# Patient Record
Sex: MALE | Race: WHITE | Employment: FULL TIME | URBAN - METROPOLITAN AREA
[De-identification: names, ages, dates, MRNs, and addresses within clinical notes are randomized per-mention and may not be internally consistent; named-entity substitution may affect disease eponyms.]

---

## 2021-05-26 ENCOUNTER — OFFICE VISIT (OUTPATIENT)
Dept: FAMILY MEDICINE CLINIC | Age: 61
End: 2021-05-26
Payer: COMMERCIAL

## 2021-05-26 VITALS
HEART RATE: 59 BPM | DIASTOLIC BLOOD PRESSURE: 62 MMHG | OXYGEN SATURATION: 96 % | WEIGHT: 245 LBS | HEIGHT: 75 IN | SYSTOLIC BLOOD PRESSURE: 98 MMHG | BODY MASS INDEX: 30.46 KG/M2

## 2021-05-26 DIAGNOSIS — Z13.1 DIABETES MELLITUS SCREENING: ICD-10-CM

## 2021-05-26 DIAGNOSIS — G89.29 CHRONIC PAIN OF RIGHT KNEE: ICD-10-CM

## 2021-05-26 DIAGNOSIS — M25.561 CHRONIC PAIN OF RIGHT KNEE: ICD-10-CM

## 2021-05-26 DIAGNOSIS — Z12.11 COLON CANCER SCREENING: ICD-10-CM

## 2021-05-26 DIAGNOSIS — Z00.00 ROUTINE GENERAL MEDICAL EXAMINATION AT A HEALTH CARE FACILITY: Primary | ICD-10-CM

## 2021-05-26 DIAGNOSIS — Z12.5 PROSTATE CANCER SCREENING: ICD-10-CM

## 2021-05-26 DIAGNOSIS — Z13.220 SCREENING CHOLESTEROL LEVEL: ICD-10-CM

## 2021-05-26 PROCEDURE — 99396 PREV VISIT EST AGE 40-64: CPT | Performed by: NURSE PRACTITIONER

## 2021-05-26 RX ORDER — IBUPROFEN 800 MG/1
TABLET ORAL
COMMUNITY
Start: 2021-02-26

## 2021-05-26 SDOH — ECONOMIC STABILITY: FOOD INSECURITY: WITHIN THE PAST 12 MONTHS, THE FOOD YOU BOUGHT JUST DIDN'T LAST AND YOU DIDN'T HAVE MONEY TO GET MORE.: NEVER TRUE

## 2021-05-26 SDOH — ECONOMIC STABILITY: FOOD INSECURITY: WITHIN THE PAST 12 MONTHS, YOU WORRIED THAT YOUR FOOD WOULD RUN OUT BEFORE YOU GOT MONEY TO BUY MORE.: NEVER TRUE

## 2021-05-26 ASSESSMENT — PATIENT HEALTH QUESTIONNAIRE - PHQ9
SUM OF ALL RESPONSES TO PHQ QUESTIONS 1-9: 0
2. FEELING DOWN, DEPRESSED OR HOPELESS: 0

## 2021-05-26 ASSESSMENT — ENCOUNTER SYMPTOMS
VOMITING: 0
DIARRHEA: 0
NAUSEA: 0
SHORTNESS OF BREATH: 0
COUGH: 0
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
## 2021-12-21 ENCOUNTER — VIRTUAL VISIT (OUTPATIENT)
Dept: FAMILY MEDICINE CLINIC | Age: 61
End: 2021-12-21
Payer: COMMERCIAL

## 2021-12-21 DIAGNOSIS — R09.89 CHEST CONGESTION: Primary | ICD-10-CM

## 2021-12-21 DIAGNOSIS — R09.81 SINUS CONGESTION: ICD-10-CM

## 2021-12-21 PROCEDURE — 99213 OFFICE O/P EST LOW 20 MIN: CPT | Performed by: FAMILY MEDICINE

## 2021-12-21 RX ORDER — ZINC GLUCONATE 50 MG
50 TABLET ORAL DAILY
COMMUNITY

## 2021-12-21 RX ORDER — DEXTROMETHORPHAN HYDROBROMIDE AND PROMETHAZINE HYDROCHLORIDE 15; 6.25 MG/5ML; MG/5ML
5 SYRUP ORAL 4 TIMES DAILY PRN
Qty: 180 ML | Refills: 1 | Status: SHIPPED | OUTPATIENT
Start: 2021-12-21

## 2021-12-21 RX ORDER — AZITHROMYCIN 250 MG/1
TABLET, FILM COATED ORAL
Qty: 1 PACKET | Refills: 0 | Status: ON HOLD | OUTPATIENT
Start: 2021-12-21 | End: 2022-04-13

## 2021-12-21 RX ORDER — MULTIVIT WITH MINERALS/LUTEIN
1000 TABLET ORAL 2 TIMES DAILY
COMMUNITY

## 2021-12-21 ASSESSMENT — PATIENT HEALTH QUESTIONNAIRE - PHQ9
1. LITTLE INTEREST OR PLEASURE IN DOING THINGS: 0
SUM OF ALL RESPONSES TO PHQ QUESTIONS 1-9: 0
SUM OF ALL RESPONSES TO PHQ9 QUESTIONS 1 & 2: 0
SUM OF ALL RESPONSES TO PHQ QUESTIONS 1-9: 0
SUM OF ALL RESPONSES TO PHQ QUESTIONS 1-9: 0
2. FEELING DOWN, DEPRESSED OR HOPELESS: 0

## 2021-12-21 ASSESSMENT — ENCOUNTER SYMPTOMS
WHEEZING: 1
SHORTNESS OF BREATH: 0
DIARRHEA: 0
NAUSEA: 0
TROUBLE SWALLOWING: 0
SORE THROAT: 1
BACK PAIN: 1
COUGH: 1

## 2021-12-21 NOTE — PROGRESS NOTES
2021    TELEHEALTH EVALUATION -- Audio/Visual (During QLJMF-29 public health emergency)    HPI:    Cynthia Alston (:  1960) has requested an audio/video evaluation for the following concern(s):    Cough and illness sx  Achy lower back. Some clear / whitish productive cough  Ex smoker- quit 10 years ago. Is working,   Review of Systems   Constitutional: Positive for fatigue. Hot / cold spells   HENT: Positive for congestion and sore throat. Negative for trouble swallowing. Respiratory: Positive for cough and wheezing. Negative for shortness of breath. Gastrointestinal: Negative for diarrhea and nausea. Had fundoplasty surgery-  Cannot vomit   Musculoskeletal: Positive for back pain. Neurological: Positive for headaches. Negative for seizures, speech difficulty and numbness. Minor HA's    There are no focal or lateralizing CNS / TIA like symptoms. Prior to Visit Medications    Medication Sig Taking? Authorizing Provider   zinc gluconate 50 MG tablet Take 50 mg by mouth daily Yes Historical Provider, MD   vitamin E 1000 units capsule Take 1,000 Units by mouth 2 times daily Yes Historical Provider, MD   ibuprofen (ADVIL;MOTRIN) 800 MG tablet TAKE 1 TABLET BY MOUTH EVERY 6 TO 8 HOURS AS NEEDED FOR MILD TO MODERATE PAIN Yes Historical Provider, MD   esomeprazole Magnesium (NEXIUM) 20 MG PACK Take by mouth daily Yes Historical Provider, MD   Magnesium 500 MG TABS Take 1,000 mg by mouth daily Yes Historical Provider, MD       Social History     Tobacco Use    Smoking status: Former Smoker     Quit date: 2016     Years since quittin.9    Smokeless tobacco: Never Used   Vaping Use    Vaping Use: Never used   Substance Use Topics    Alcohol use: Yes     Comment: 2-3 times a week    Drug use:  No            PHYSICAL EXAMINATION:  [ INSTRUCTIONS:  \"[x]\" Indicates a positive item  \"[]\" Indicates a negative item  -- DELETE ALL ITEMS NOT EXAMINED]  Vital Signs: (As obtained by patient/caregiver or practitioner observation)    Blood pressure-  Heart rate-    Respiratory rate-    Temperature-  Pulse oximetry-     Constitutional: [x] Appears well-developed and well-nourished [x] No apparent distress      [] Abnormal-   Mental status  [x] Alert and awake  [x] Oriented to person/place/time [x]Able to follow commands      Eyes:  EOM    [x]  Normal  [] Abnormal-  Sclera  []  Normal  [] Abnormal -         Discharge []  None visible  [] Abnormal -    HENT:   [x] Normocephalic, atraumatic. [] Abnormal   [] Mouth/Throat: Mucous membranes are moist.     External Ears [] Normal  [] Abnormal-     Neck: [] No visualized mass     Pulmonary/Chest: [x] Respiratory effort normal.  [x] No visualized signs of difficulty breathing or respiratory distress        [x] Abnormal- some cough  Sound loose / productive some   Musculoskeletal:   [] Normal gait with no signs of ataxia         [] Normal range of motion of neck        [] Abnormal-       Neurological:        [x] No Facial Asymmetry (Cranial nerve 7 motor function) (limited exam to video visit)          [x] No gaze palsy        [] Abnormal-         Skin:        [] No significant exanthematous lesions or discoloration noted on facial skin         [] Abnormal-            Psychiatric:       [x] Normal Affect [x] No Hallucinations        [] Abnormal-     Other pertinent observable physical exam findings-       ASSESSMENT    Sinus congestion  Cough        Symptoms discussed in the be viral.  Discussed symptomatic relief and antibiotics. Will prescribe but recommend he wait 1 or 2 days to take the antibiotic if possible as this may be viral and clear pretty much on its own. Patient expressed understanding. meds discussed. Try holding antibiotics. inst re meds    Phenergan dm  zithromax    covid discussed. Test if worse or sense of taste / smell changes    Follow-up in 2-3 days if significantly worse.     Nicol Fuentes Fe Dolan was evaluated through a synchronous (real-time) audio-video encounter. The patient (or guardian if applicable) is aware that this is a billable service. Verbal consent to proceed has been obtained within the past 12 months. The visit was conducted pursuant to the emergency declaration under the [address] [place] [address] and the [employer] Act. Patient identification was verified, and a caregiver was present when appropriate. The patient was located in a state where the provider was credentialed to provide care. Total time spent on this encounter: Not billed by time    --Ann Marie Vanegas DO on 12/21/2021 at 2:40 PM    An electronic signature was used to authenticate this note.

## 2021-12-23 ENCOUNTER — TELEPHONE (OUTPATIENT)
Dept: FAMILY MEDICINE CLINIC | Age: 61
End: 2021-12-23

## 2022-04-13 ENCOUNTER — APPOINTMENT (OUTPATIENT)
Dept: GENERAL RADIOLOGY | Age: 62
End: 2022-04-13
Payer: COMMERCIAL

## 2022-04-13 ENCOUNTER — HOSPITAL ENCOUNTER (OUTPATIENT)
Age: 62
Setting detail: OBSERVATION
Discharge: HOME | End: 2022-04-14
Attending: EMERGENCY MEDICINE | Admitting: INTERNAL MEDICINE
Payer: COMMERCIAL

## 2022-04-13 DIAGNOSIS — R07.9 CHEST PAIN, UNSPECIFIED TYPE: Primary | ICD-10-CM

## 2022-04-13 PROBLEM — R07.89 OTHER CHEST PAIN: Status: ACTIVE | Noted: 2022-04-13

## 2022-04-13 LAB
A/G RATIO: 1.7 (ref 1.1–2.2)
ALBUMIN SERPL-MCNC: 4.5 G/DL (ref 3.4–5)
ALP BLD-CCNC: 78 U/L (ref 40–129)
ALT SERPL-CCNC: 17 U/L (ref 10–40)
ANION GAP SERPL CALCULATED.3IONS-SCNC: 11 MMOL/L (ref 3–16)
AST SERPL-CCNC: 15 U/L (ref 15–37)
BASOPHILS ABSOLUTE: 0 K/UL (ref 0–0.2)
BASOPHILS RELATIVE PERCENT: 0.5 %
BILIRUB SERPL-MCNC: 0.4 MG/DL (ref 0–1)
BUN BLDV-MCNC: 16 MG/DL (ref 7–20)
CALCIUM SERPL-MCNC: 9.3 MG/DL (ref 8.3–10.6)
CHLORIDE BLD-SCNC: 105 MMOL/L (ref 99–110)
CO2: 25 MMOL/L (ref 21–32)
CREAT SERPL-MCNC: 0.8 MG/DL (ref 0.8–1.3)
EKG ATRIAL RATE: 61 BPM
EKG DIAGNOSIS: NORMAL
EKG P AXIS: 96 DEGREES
EKG P-R INTERVAL: 170 MS
EKG Q-T INTERVAL: 402 MS
EKG QRS DURATION: 92 MS
EKG QTC CALCULATION (BAZETT): 404 MS
EKG R AXIS: 16 DEGREES
EKG T AXIS: 41 DEGREES
EKG VENTRICULAR RATE: 61 BPM
EOSINOPHILS ABSOLUTE: 0 K/UL (ref 0–0.6)
EOSINOPHILS RELATIVE PERCENT: 0.7 %
GFR AFRICAN AMERICAN: >60
GFR NON-AFRICAN AMERICAN: >60
GLUCOSE BLD-MCNC: 119 MG/DL (ref 70–99)
HCT VFR BLD CALC: 44.1 % (ref 40.5–52.5)
HEMOGLOBIN: 15.3 G/DL (ref 13.5–17.5)
INFLUENZA A: NOT DETECTED
INFLUENZA B: NOT DETECTED
LYMPHOCYTES ABSOLUTE: 1.3 K/UL (ref 1–5.1)
LYMPHOCYTES RELATIVE PERCENT: 22 %
MCH RBC QN AUTO: 33 PG (ref 26–34)
MCHC RBC AUTO-ENTMCNC: 34.6 G/DL (ref 31–36)
MCV RBC AUTO: 95.5 FL (ref 80–100)
MONOCYTES ABSOLUTE: 0.6 K/UL (ref 0–1.3)
MONOCYTES RELATIVE PERCENT: 9.7 %
NEUTROPHILS ABSOLUTE: 4 K/UL (ref 1.7–7.7)
NEUTROPHILS RELATIVE PERCENT: 67.1 %
PDW BLD-RTO: 13.3 % (ref 12.4–15.4)
PLATELET # BLD: 158 K/UL (ref 135–450)
PMV BLD AUTO: 9.4 FL (ref 5–10.5)
POTASSIUM REFLEX MAGNESIUM: 4.1 MMOL/L (ref 3.5–5.1)
RBC # BLD: 4.62 M/UL (ref 4.2–5.9)
SARS-COV-2 RNA, RT PCR: NOT DETECTED
SODIUM BLD-SCNC: 141 MMOL/L (ref 136–145)
TOTAL PROTEIN: 7.1 G/DL (ref 6.4–8.2)
TROPONIN: <0.01 NG/ML
TROPONIN: <0.01 NG/ML
WBC # BLD: 6 K/UL (ref 4–11)

## 2022-04-13 PROCEDURE — 2060000000 HC ICU INTERMEDIATE R&B

## 2022-04-13 PROCEDURE — 84484 ASSAY OF TROPONIN QUANT: CPT

## 2022-04-13 PROCEDURE — 2580000003 HC RX 258: Performed by: INTERNAL MEDICINE

## 2022-04-13 PROCEDURE — 96374 THER/PROPH/DIAG INJ IV PUSH: CPT

## 2022-04-13 PROCEDURE — 93005 ELECTROCARDIOGRAM TRACING: CPT | Performed by: EMERGENCY MEDICINE

## 2022-04-13 PROCEDURE — 80053 COMPREHEN METABOLIC PANEL: CPT

## 2022-04-13 PROCEDURE — 99284 EMERGENCY DEPT VISIT MOD MDM: CPT

## 2022-04-13 PROCEDURE — 6370000000 HC RX 637 (ALT 250 FOR IP): Performed by: PHYSICIAN ASSISTANT

## 2022-04-13 PROCEDURE — 71046 X-RAY EXAM CHEST 2 VIEWS: CPT

## 2022-04-13 PROCEDURE — 6360000002 HC RX W HCPCS: Performed by: INTERNAL MEDICINE

## 2022-04-13 PROCEDURE — 85025 COMPLETE CBC W/AUTO DIFF WBC: CPT

## 2022-04-13 PROCEDURE — 87636 SARSCOV2 & INF A&B AMP PRB: CPT

## 2022-04-13 PROCEDURE — 96375 TX/PRO/DX INJ NEW DRUG ADDON: CPT

## 2022-04-13 PROCEDURE — 6370000000 HC RX 637 (ALT 250 FOR IP): Performed by: INTERNAL MEDICINE

## 2022-04-13 PROCEDURE — 93010 ELECTROCARDIOGRAM REPORT: CPT | Performed by: INTERNAL MEDICINE

## 2022-04-13 PROCEDURE — 6360000002 HC RX W HCPCS: Performed by: PHYSICIAN ASSISTANT

## 2022-04-13 PROCEDURE — 36415 COLL VENOUS BLD VENIPUNCTURE: CPT

## 2022-04-13 PROCEDURE — G0378 HOSPITAL OBSERVATION PER HR: HCPCS

## 2022-04-13 RX ORDER — ACETAMINOPHEN 650 MG/1
650 SUPPOSITORY RECTAL EVERY 6 HOURS PRN
Status: DISCONTINUED | OUTPATIENT
Start: 2022-04-13 | End: 2022-04-14 | Stop reason: HOSPADM

## 2022-04-13 RX ORDER — SODIUM CHLORIDE 0.9 % (FLUSH) 0.9 %
5-40 SYRINGE (ML) INJECTION EVERY 12 HOURS SCHEDULED
Status: DISCONTINUED | OUTPATIENT
Start: 2022-04-13 | End: 2022-04-14 | Stop reason: HOSPADM

## 2022-04-13 RX ORDER — SODIUM CHLORIDE 0.9 % (FLUSH) 0.9 %
5-40 SYRINGE (ML) INJECTION PRN
Status: DISCONTINUED | OUTPATIENT
Start: 2022-04-13 | End: 2022-04-14 | Stop reason: HOSPADM

## 2022-04-13 RX ORDER — ACETAMINOPHEN 325 MG/1
650 TABLET ORAL EVERY 6 HOURS PRN
Status: DISCONTINUED | OUTPATIENT
Start: 2022-04-13 | End: 2022-04-14 | Stop reason: HOSPADM

## 2022-04-13 RX ORDER — ONDANSETRON 2 MG/ML
4 INJECTION INTRAMUSCULAR; INTRAVENOUS EVERY 6 HOURS PRN
Status: DISCONTINUED | OUTPATIENT
Start: 2022-04-13 | End: 2022-04-14 | Stop reason: HOSPADM

## 2022-04-13 RX ORDER — ASPIRIN 81 MG/1
81 TABLET, CHEWABLE ORAL DAILY
Status: DISCONTINUED | OUTPATIENT
Start: 2022-04-14 | End: 2022-04-14 | Stop reason: HOSPADM

## 2022-04-13 RX ORDER — SODIUM CHLORIDE 9 MG/ML
INJECTION, SOLUTION INTRAVENOUS PRN
Status: DISCONTINUED | OUTPATIENT
Start: 2022-04-13 | End: 2022-04-14 | Stop reason: HOSPADM

## 2022-04-13 RX ORDER — MORPHINE SULFATE 4 MG/ML
4 INJECTION, SOLUTION INTRAMUSCULAR; INTRAVENOUS ONCE
Status: COMPLETED | OUTPATIENT
Start: 2022-04-13 | End: 2022-04-13

## 2022-04-13 RX ORDER — ONDANSETRON 2 MG/ML
4 INJECTION INTRAMUSCULAR; INTRAVENOUS EVERY 6 HOURS PRN
Status: DISCONTINUED | OUTPATIENT
Start: 2022-04-13 | End: 2022-04-13

## 2022-04-13 RX ORDER — ATORVASTATIN CALCIUM 40 MG/1
40 TABLET, FILM COATED ORAL NIGHTLY
Status: DISCONTINUED | OUTPATIENT
Start: 2022-04-13 | End: 2022-04-14 | Stop reason: HOSPADM

## 2022-04-13 RX ORDER — ASPIRIN 325 MG
325 TABLET ORAL ONCE
Status: COMPLETED | OUTPATIENT
Start: 2022-04-13 | End: 2022-04-13

## 2022-04-13 RX ORDER — ONDANSETRON 4 MG/1
4 TABLET, ORALLY DISINTEGRATING ORAL EVERY 8 HOURS PRN
Status: DISCONTINUED | OUTPATIENT
Start: 2022-04-13 | End: 2022-04-14 | Stop reason: HOSPADM

## 2022-04-13 RX ORDER — POLYETHYLENE GLYCOL 3350 17 G/17G
17 POWDER, FOR SOLUTION ORAL DAILY PRN
Status: DISCONTINUED | OUTPATIENT
Start: 2022-04-13 | End: 2022-04-14 | Stop reason: HOSPADM

## 2022-04-13 RX ADMIN — ACETAMINOPHEN 650 MG: 325 TABLET ORAL at 21:42

## 2022-04-13 RX ADMIN — SODIUM CHLORIDE, PRESERVATIVE FREE 10 ML: 5 INJECTION INTRAVENOUS at 21:42

## 2022-04-13 RX ADMIN — ENOXAPARIN SODIUM 30 MG: 100 INJECTION SUBCUTANEOUS at 21:42

## 2022-04-13 RX ADMIN — NITROGLYCERIN 1 INCH: 20 OINTMENT TOPICAL at 16:04

## 2022-04-13 RX ADMIN — ONDANSETRON HYDROCHLORIDE 4 MG: 2 INJECTION, SOLUTION INTRAMUSCULAR; INTRAVENOUS at 16:04

## 2022-04-13 RX ADMIN — ATORVASTATIN CALCIUM 40 MG: 40 TABLET, FILM COATED ORAL at 21:42

## 2022-04-13 RX ADMIN — ASPIRIN 325 MG: 325 TABLET ORAL at 16:04

## 2022-04-13 RX ADMIN — MORPHINE SULFATE 4 MG: 4 INJECTION, SOLUTION INTRAMUSCULAR; INTRAVENOUS at 16:04

## 2022-04-13 ASSESSMENT — PAIN DESCRIPTION - ORIENTATION
ORIENTATION: MID
ORIENTATION: MID

## 2022-04-13 ASSESSMENT — PAIN SCALES - GENERAL
PAINLEVEL_OUTOF10: 6
PAINLEVEL_OUTOF10: 6
PAINLEVEL_OUTOF10: 4
PAINLEVEL_OUTOF10: 6
PAINLEVEL_OUTOF10: 4

## 2022-04-13 ASSESSMENT — PAIN DESCRIPTION - PAIN TYPE
TYPE: ACUTE PAIN
TYPE: ACUTE PAIN

## 2022-04-13 ASSESSMENT — PAIN DESCRIPTION - LOCATION
LOCATION: CHEST
LOCATION: CHEST

## 2022-04-13 ASSESSMENT — ENCOUNTER SYMPTOMS
GASTROINTESTINAL NEGATIVE: 1
RESPIRATORY NEGATIVE: 1

## 2022-04-13 ASSESSMENT — PAIN DESCRIPTION - FREQUENCY
FREQUENCY: CONTINUOUS
FREQUENCY: CONTINUOUS

## 2022-04-13 ASSESSMENT — PAIN DESCRIPTION - DESCRIPTORS
DESCRIPTORS: ACHING;TIGHTNESS
DESCRIPTORS: TIGHTNESS;ACHING;SHARP

## 2022-04-13 ASSESSMENT — HEART SCORE: ECG: 0

## 2022-04-13 NOTE — ED PROVIDER NOTES
I independently evaluated and obtained a history and physical on Alvaro Leslie. I personally saw the patient and performed a substantive portion of the visit including all aspects of the medical decision making. All diagnostic, treatment, and disposition assistants were made to myself in conjunction the advanced practice provider. For further details of this patient's emergency department encounter, please see the advanced practice provider's documentation. History: Patient is a 70-year-old male who presents for 24 hours of substernal chest pain which is rating to his shoulder and neck. Patient reports pain is sharp he can take, constant, and worsening. He denies any known aggravating or alleviating factors. No focal neurologic deficits. Lungs clear to auscultation. Regular rate and rhythm  Physician Exam: Pleasant adult  male in no acute distress. MDM:    I personally saw the patient and performed a substantive portion of the visit including all aspects of the medical decision making. The Ekg interpreted by me shows  normal sinus rhythm with a rate of 61  Axis is   Normal  QTc is  404ms  Intervals and Durations are unremarkable. ST Segments: normal  No previous EKGs available for comparison    Initial EKG and troponin undetectable. Patient with heart score of 4. Patient is given aspirin nitroglycerin and admitted for further evaluation and care. Patient expresses understanding and agreement with this plan. FINAL IMPRESSION      1.  Chest pain, unspecified type               Petey Myers MD  04/13/22 1640

## 2022-04-13 NOTE — ED PROVIDER NOTES
[address] [facility]  EMERGENCY DEPARTMENT ENCOUNTER        Pt Name: Leonila Moore  MRN: [id number]  [place] 1960  Date of evaluation: 4/13/2022  Provider: Shital Shah PA-C  PCP: SWAYER Garcia CNP  Note Started: 3:38 PM EDT        I have seen and evaluated this patient with my supervising physician Prabhu Olivo MD.    [address]       Chief Complaint   Patient presents with    Chest Pain     Midsternal chest pain for 24 hours, states pain goes in to shoulder and neck       HISTORY OF PRESENT ILLNESS   (Location, Timing/Onset, Context/Setting, Quality, Duration, Modifying Factors, Severity, Associated Signs and Symptoms)  Note limiting factors. Chief Complaint: chest pain     Leonila Moore is a 58 y.o. male with a past medical history of former smoker, GERD brought in today by private vehicle with complaints of midsternal chest pain over the past 1 day. That he developed midsternal chest pain yesterday while at work while at rest.  Describes it as a dull deep ache. States that the pain radiates into his neck and left shoulder. Denies shortness of breath. Denies numbness or tingling. Onset over the past 24 hours. Duration symptoms have been persistent since onset. Context includes midsternal chest pain. No aggravating symptoms. No alleviating symptoms. Denies any injury or trauma. States has never had pain like this before. Took ibuprofen this morning with no relief. Rates current pain a 7 out of 10. Otherwise denies any other complaints. Nothing seems to make symptoms better or worse. Nursing Notes were all reviewed and agreed with or any disagreements were addressed in the HPI. REVIEW OF SYSTEMS    (2-9 systems for level 4, 10 or more for level 5)     Review of Systems   Constitutional: Negative. HENT: Negative. Respiratory: Negative. Cardiovascular: Positive for chest pain. Gastrointestinal: Negative. Genitourinary: Negative. Neurological: Negative. Positives and Pertinent negatives as per HPI. Except as noted above in the ROS, all other systems were reviewed and negative. PAST MEDICAL HISTORY     Past Medical History:   Diagnosis Date    Diverticulitis     GERD (gastroesophageal reflux disease)          SURGICAL HISTORY     Past Surgical History:   Procedure Laterality Date    CHOLECYSTECTOMY      GASTRIC FUNDOPLICATION      KNEE SURGERY Right     multiple         CURRENTMEDICATIONS       Previous Medications    AZITHROMYCIN (ZITHROMAX Z-MARTHA) 250 MG TABLET    As directed on pack    ESOMEPRAZOLE MAGNESIUM (NEXIUM) 20 MG PACK    Take by mouth daily    IBUPROFEN (ADVIL;MOTRIN) 800 MG TABLET    TAKE 1 TABLET BY MOUTH EVERY 6 TO 8 HOURS AS NEEDED FOR MILD TO MODERATE PAIN    MAGNESIUM 500 MG TABS    Take 1,000 mg by mouth daily    PROMETHAZINE-DEXTROMETHORPHAN (PROMETHAZINE-DM) 6.25-15 MG/5ML SYRUP    Take 5 mLs by mouth 4 times daily as needed for Cough (coughing. .caution drowsiness)    VITAMIN E 1000 UNITS CAPSULE    Take 1,000 Units by mouth 2 times daily    ZINC GLUCONATE 50 MG TABLET    Take 50 mg by mouth daily         ALLERGIES     Pcn [penicillins]    FAMILYHISTORY       Family History   Problem Relation Age of Onset    Colon Cancer Father     Parkinsonism Father           SOCIAL HISTORY       Social History     Tobacco Use    Smoking status: Former Smoker     Quit date:      Years since quittin.2    Smokeless tobacco: Never Used   Vaping Use    Vaping Use: Never used   Substance Use Topics    Alcohol use: Yes     Comment: 2-3 times a week    Drug use: No       SCREENINGS    Olga Coma Scale  Eye Opening: Spontaneous  Best Verbal Response: Oriented  Best Motor Response: Obeys commands  Olga Coma Scale Score: 15 Heart Score for chest pain patients  History: Highly Suspicious  ECG: Normal  Patient Age: > 39 and < 65 years  *Risk factors for Atherosclerotic disease: Obesity,Cigarette smoking,Positive family History  Risk Factors: 1 or 2 risk factors  Troponin: < 1X normal limit  Heart Score Total: 4      PHYSICAL EXAM    (up to 7 for level 4, 8 or more for level 5)     ED Triage Vitals [04/13/22 1515]   BP Temp Temp Source Pulse Resp SpO2 Height Weight   (!) 161/90 97.5 °F (36.4 °C) Temporal 60 16 98 % -- --       Physical Exam  Vitals and nursing note reviewed. Constitutional:       General: He is awake. He is not in acute distress. Appearance: Normal appearance. He is well-developed and overweight. He is not ill-appearing, toxic-appearing or diaphoretic. HENT:      Head: Normocephalic and atraumatic. Nose: Nose normal.   Eyes:      General:         Right eye: No discharge. Left eye: No discharge. Cardiovascular:      Rate and Rhythm: Normal rate and regular rhythm. Pulses:           Radial pulses are 2+ on the right side and 2+ on the left side. Heart sounds: Normal heart sounds. No murmur heard. No gallop. Pulmonary:      Effort: Pulmonary effort is normal. No respiratory distress. Breath sounds: Normal breath sounds. No decreased breath sounds, wheezing, rhonchi or rales. Chest:      Chest wall: No tenderness. Musculoskeletal:         General: No deformity. Normal range of motion. Cervical back: Normal range of motion and neck supple. Right lower leg: No edema. Left lower leg: No edema. Skin:     General: Skin is warm and dry. Neurological:      Mental Status: He is alert and oriented to person, place, and time. Psychiatric:         Behavior: Behavior normal. Behavior is cooperative.          DIAGNOSTIC RESULTS   LABS:    Labs Reviewed   COMPREHENSIVE METABOLIC PANEL W/ REFLEX TO MG FOR LOW K - Abnormal; Notable for the following components:       Result Value    Glucose 119 (*)     All other components within normal limits   COVID-19 & INFLUENZA COMBO   TROPONIN   CBC WITH AUTO DIFFERENTIAL       When ordered only abnormal lab results are displayed. All other labs were within normal range or not returned as of this dictation. EKG: When ordered, EKG's are interpreted by the Emergency Department Physician in the absence of a cardiologist.  Please see their note for interpretation of EKG. RADIOLOGY:   Non-plain film images such as CT, Ultrasound and MRI are read by the radiologist. Plain radiographic images are visualized and preliminarily interpreted by the ED Provider with the below findings:        Interpretation per the Radiologist below, if available at the time of this note:    XR CHEST (2 VW)   Final Result   Clear lungs. No results found. PROCEDURES   Unless otherwise noted below, none     Procedures    CRITICAL CARE TIME       CONSULTS:  IP CONSULT TO HOSPITALIST      EMERGENCY DEPARTMENT COURSE and DIFFERENTIAL DIAGNOSIS/MDM:   Vitals:    Vitals:    04/13/22 1515 04/13/22 1600 04/13/22 1615 04/13/22 1645   BP: (!) 161/90 124/80 (!) 141/88 136/83   Pulse: 60 60 57 55   Resp: 16 15 9 13   Temp: 97.5 °F (36.4 °C)      TempSrc: Temporal      SpO2: 98% 95% 92% 93%       Patient was given the following medications:  Medications   ondansetron (ZOFRAN) injection 4 mg (4 mg IntraVENous Given 4/13/22 1604)   aspirin tablet 325 mg (325 mg Oral Given 4/13/22 1604)   morphine sulfate (PF) injection 4 mg (4 mg IntraVENous Given 4/13/22 1604)   nitroglycerin (NITRO-BID) 2 % ointment 1 inch (1 inch Topical Given 4/13/22 1604)           Patient brought in today by private vehicle with complaints of midsternal chest pain x24 hours. On exam he is alert oriented afebrile breathing on room air satting at 98%. Nontoxic in appearance. No acute respiratory distress. Old labs and records reviewed. Patient seen by myself as well as my attending. EKG reviewed by my attending see note. CBC shows no white count. Hemoglobin of 15.3. Chest x-ray shows clear lungs.   Patient was given full dose aspirin and morphine and Nitropaste here. Troponin less than 0.01. No acute electrolyte abnormalities. Kidney function unremarkable. Patient has a heart score of 4. He was given medication here and upon reevaluation states his pain has improved. We will plan to admit at this time. I spoke to the hospitalist Dr. Windy Orantes did accept this admission. Patient stable at time of admission. FINAL IMPRESSION      1. Chest pain, unspecified type          DISPOSITION/PLAN   DISPOSITION Decision To Admit 04/13/2022 04:40:15 PM      PATIENT REFERRED TO:  No follow-up provider specified.     DISCHARGE MEDICATIONS:  New Prescriptions    No medications on file       DISCONTINUED MEDICATIONS:  Discontinued Medications    No medications on file              (Please note that portions of this note were completed with a voice recognition program.  Efforts were made to edit the dictations but occasionally words are mis-transcribed.)    Digna Vaughn PA-C (electronically signed)            Digna Vaughn PA-C  04/13/22 1679

## 2022-04-13 NOTE — ED NOTES
[phone] Perfectserve sent to Dr. Jessenia Rasheed  04/13/22 [phone] Consult completed with callback from Dr. Steven Cunningham spoke with Anselmo SEYMOUR.       Christopher Rae  04/13/22 0882

## 2022-04-14 ENCOUNTER — APPOINTMENT (OUTPATIENT)
Dept: NUCLEAR MEDICINE | Age: 62
End: 2022-04-14
Payer: COMMERCIAL

## 2022-04-14 VITALS
TEMPERATURE: 98.2 F | HEIGHT: 75 IN | OXYGEN SATURATION: 94 % | WEIGHT: 242.8 LBS | RESPIRATION RATE: 18 BRPM | SYSTOLIC BLOOD PRESSURE: 127 MMHG | DIASTOLIC BLOOD PRESSURE: 81 MMHG | BODY MASS INDEX: 30.19 KG/M2 | HEART RATE: 68 BPM

## 2022-04-14 LAB
CHOLESTEROL, TOTAL: 183 MG/DL (ref 0–199)
EKG ATRIAL RATE: 58 BPM
EKG DIAGNOSIS: NORMAL
EKG P AXIS: 57 DEGREES
EKG P-R INTERVAL: 166 MS
EKG Q-T INTERVAL: 418 MS
EKG QRS DURATION: 96 MS
EKG QTC CALCULATION (BAZETT): 410 MS
EKG R AXIS: 45 DEGREES
EKG T AXIS: 64 DEGREES
EKG VENTRICULAR RATE: 58 BPM
HCT VFR BLD CALC: 42.2 % (ref 40.5–52.5)
HDLC SERPL-MCNC: 43 MG/DL (ref 40–60)
HEMOGLOBIN: 14.5 G/DL (ref 13.5–17.5)
LDL CHOLESTEROL CALCULATED: 125 MG/DL
LV EF: 65 %
LVEF MODALITY: NORMAL
MCH RBC QN AUTO: 32.9 PG (ref 26–34)
MCHC RBC AUTO-ENTMCNC: 34.4 G/DL (ref 31–36)
MCV RBC AUTO: 95.4 FL (ref 80–100)
PDW BLD-RTO: 13.3 % (ref 12.4–15.4)
PLATELET # BLD: 147 K/UL (ref 135–450)
PMV BLD AUTO: 9.6 FL (ref 5–10.5)
RBC # BLD: 4.42 M/UL (ref 4.2–5.9)
TRIGL SERPL-MCNC: 77 MG/DL (ref 0–150)
TROPONIN: <0.01 NG/ML
VLDLC SERPL CALC-MCNC: 15 MG/DL
WBC # BLD: 5.6 K/UL (ref 4–11)

## 2022-04-14 PROCEDURE — 36415 COLL VENOUS BLD VENIPUNCTURE: CPT

## 2022-04-14 PROCEDURE — 93005 ELECTROCARDIOGRAM TRACING: CPT | Performed by: INTERNAL MEDICINE

## 2022-04-14 PROCEDURE — A9502 TC99M TETROFOSMIN: HCPCS | Performed by: INTERNAL MEDICINE

## 2022-04-14 PROCEDURE — 2580000003 HC RX 258: Performed by: INTERNAL MEDICINE

## 2022-04-14 PROCEDURE — G0378 HOSPITAL OBSERVATION PER HR: HCPCS

## 2022-04-14 PROCEDURE — 6370000000 HC RX 637 (ALT 250 FOR IP): Performed by: INTERNAL MEDICINE

## 2022-04-14 PROCEDURE — 93017 CV STRESS TEST TRACING ONLY: CPT

## 2022-04-14 PROCEDURE — 85027 COMPLETE CBC AUTOMATED: CPT

## 2022-04-14 PROCEDURE — 84484 ASSAY OF TROPONIN QUANT: CPT

## 2022-04-14 PROCEDURE — 99220 PR INITIAL OBSERVATION CARE/DAY 70 MINUTES: CPT | Performed by: INTERNAL MEDICINE

## 2022-04-14 PROCEDURE — 93010 ELECTROCARDIOGRAM REPORT: CPT | Performed by: INTERNAL MEDICINE

## 2022-04-14 PROCEDURE — 78452 HT MUSCLE IMAGE SPECT MULT: CPT

## 2022-04-14 PROCEDURE — 3430000000 HC RX DIAGNOSTIC RADIOPHARMACEUTICAL: Performed by: INTERNAL MEDICINE

## 2022-04-14 PROCEDURE — 80061 LIPID PANEL: CPT

## 2022-04-14 RX ADMIN — ACETAMINOPHEN 650 MG: 325 TABLET ORAL at 06:15

## 2022-04-14 RX ADMIN — TETROFOSMIN 10.1 MILLICURIE: 1.38 INJECTION, POWDER, LYOPHILIZED, FOR SOLUTION INTRAVENOUS at 12:05

## 2022-04-14 RX ADMIN — ASPIRIN 81 MG 81 MG: 81 TABLET ORAL at 17:08

## 2022-04-14 RX ADMIN — SODIUM CHLORIDE, PRESERVATIVE FREE 10 ML: 5 INJECTION INTRAVENOUS at 17:08

## 2022-04-14 RX ADMIN — TETROFOSMIN 31.4 MILLICURIE: 1.38 INJECTION, POWDER, LYOPHILIZED, FOR SOLUTION INTRAVENOUS at 14:30

## 2022-04-14 ASSESSMENT — PAIN SCALES - GENERAL
PAINLEVEL_OUTOF10: 7
PAINLEVEL_OUTOF10: 5

## 2022-04-14 ASSESSMENT — PAIN DESCRIPTION - LOCATION: LOCATION: CHEST

## 2022-04-14 ASSESSMENT — PAIN DESCRIPTION - ORIENTATION: ORIENTATION: MID

## 2022-04-14 ASSESSMENT — PAIN DESCRIPTION - PAIN TYPE: TYPE: ACUTE PAIN

## 2022-04-14 ASSESSMENT — PAIN DESCRIPTION - FREQUENCY: FREQUENCY: CONTINUOUS

## 2022-04-14 ASSESSMENT — PAIN DESCRIPTION - DESCRIPTORS: DESCRIPTORS: ACHING;TIGHTNESS

## 2022-04-14 NOTE — PROGRESS NOTES
Patient admitted to room 305 from . Patient oriented to room, call light, bed rails, phone, lights and bathroom. Patient instructed about the schedule of the day including: vital sign frequency, lab draws, possible tests, frequency of MD and staff rounds, daily weights, I &O's and prescribed diet. Telemetry box in place, patient aware of placement and reason. Bed locked, in lowest position, side rails up 2/4, call light within reach. Recliner Assessment  Patient is able to demonstrate the ability to move from a reclining position to an upright position within the recliner. 4 Eyes Skin Assessment     The patient is being assess for   Admission    I agree that 2 RN's have performed a thorough Head to Toe Skin Assessment on the patient. ALL assessment sites listed below have been assessed. Areas assessed for pressure by both nurses:   [x]   Head, Face, and Ears   [x]   Shoulders, Back, and Chest, Abdomen  [x]   Arms, Elbows, and Hands   [x]   Coccyx, Sacrum, and Ischium  [x]   Legs, Feet, and Heels     Skin is clean, dry, and intact. Scattered bruising and abrasions. Skin Assessed Under all Medical Devices by both nurses:  N/A              All Mepilex Borders were peeled back and area peeked at by both nurses:  No: N/A  Please list where Mepilex Borders are located:             **SHARE this note so that the co-signing nurse is able to place an eSignature**    Co-signer eSignature: {Esignature:[id number]}    Does the Patient have Skin Breakdown related to pressure?   No            Javier Prevention initiated:  NA   Wound Care Orders initiated:  NA      [facility] nurse consulted for Pressure Injury (Stage 3,4, Unstageable, DTI, NWPT, Complex wounds)and New or Established Ostomies:  NA      Primary Nurse eSignature: Electronically signed by Bonifacio Esparza RN on 4/14/22 at 3:30 AM EDT

## 2022-04-14 NOTE — FLOWSHEET NOTE
04/13/22 2113   Vital Signs   Temp 97.3 °F (36.3 °C)   Temp Source Oral   Pulse 58   Heart Rate Source Monitor   Resp 18   BP (!) 150/83   BP Location Right upper arm   Patient Position Sitting   Level of Consciousness Alert (0)   MEWS Score 1   Patient Currently in Pain Denies   Pain Assessment   Pain Level 6   Pain Type Acute pain   Pain Location Chest   Pain Orientation Mid  (radiates to neck and left shoulder)   Pain Descriptors Aching;Tightness   Pain Frequency Continuous   Oxygen Therapy   SpO2 96 %   O2 Device None (Room air)   Height and Weight   Height 6' 3\" (1.905 m)   Weight 242 lb 11.2 oz (110.1 kg)   Weight Method Standing scale   BSA (Calculated - sq m) 2.41 sq meters   BMI (Calculated) 30.4     Admission assessment complete, see flowsheets. Medications given, see MAR. VSS. No s/s of distress. Pt complaining of 6/10 chest pain and headache, PRN given. Chest pain staying consistent. Pt denies SOB. No needs at this time. Pt educated on NPO status at 0000. Drink provided. No needs at this time. Bed locked in lowest position. Call light and bedside table within reach. Will continue to monitor.

## 2022-04-14 NOTE — FLOWSHEET NOTE
04/14/22 0215   Vital Signs   Temp 97 °F (36.1 °C)   Temp Source Oral   Pulse 67   Heart Rate Source Monitor   Resp 16   BP (!) 100/55   BP Location Right upper arm   Patient Position Lying left side   Level of Consciousness Alert (0)   MEWS Score 2   Patient Currently in Pain Yes   Pain Assessment   Pain Level 5   Pain Type Acute pain   Pain Location Chest   Pain Orientation Mid   Pain Descriptors Aching;Tightness   Pain Frequency Continuous   Oxygen Therapy   SpO2 91 %   O2 Device None (Room air)   Height and Weight   Height 6' 3\" (1.905 m)   Weight 242 lb 12.8 oz (110.1 kg)   Weight Method Standing scale   BSA (Calculated - sq m) 2.41 sq meters   BMI (Calculated) 30.4     Pt resting in bed asleep. VSS. No s/s of distress. Pt denies complaints of SOB. Pt complaining of 5/10 chest pain, no worse than before. Pt states that he has pain in his neck and left shoulder when he moves around. Pt made NPO at midnight. No needs at this time. Bed locked in lowest position. Call light and bedside table within reach. Will continue to monitor.

## 2022-04-14 NOTE — FLOWSHEET NOTE
04/14/22 1706   Vital Signs   Temp 98.2 °F (36.8 °C)   Temp Source Oral   Pulse 68   Heart Rate Source Monitor   Resp 18   /81   BP Location Right upper arm   Patient Position Semi fowlers   Level of Consciousness Alert (0)   MEWS Score 1   Patient Currently in Pain Denies   Oxygen Therapy   SpO2 94 %   Pulse Oximeter Device Mode Intermittent   Pulse Oximeter Device Location Finger   O2 Device None (Room air)   Patient returned from stress. Diet ordered. meds given. Awaiting results.

## 2022-04-14 NOTE — PROGRESS NOTES
Patient educated on discharge instructions as well as new medications use, dosage, administration and possible side effects. Patient verified knowledge. IV removed without difficulty and dry dressing in place. Telemetry monitor removed and returned to [facility]. Pt waiting to leave facility in stable condition to Home with all of their personal belongings.

## 2022-04-14 NOTE — FLOWSHEET NOTE
04/14/22 0856   Vital Signs   Temp 97.9 °F (36.6 °C)   Temp Source Oral   Pulse 60   Heart Rate Source Monitor   Resp 16   /80   BP Location Right upper arm   Patient Position Semi fowlers   Level of Consciousness Alert (0)   MEWS Score 1   Patient Currently in Pain Denies   Oxygen Therapy   SpO2 93 %   Pulse Oximeter Device Mode Intermittent   Pulse Oximeter Device Location Finger   O2 Device None (Room air)   Patient is resting showing no s/s of distress. Patient is alert and oriented. Meds were HELD for cardiac testing, see MAR. Patient is denying any needs. Bed is in lowest position and call light is within reach. Will continue to monitor. Shift assessment complete, see flowsheets. Patient states the chest pain is still persistent but not as intense.

## 2022-04-14 NOTE — H&P
Hospital Medicine History & Physical      PCP: SAWYER Chappell - CNP    Date of Admission: 4/13/2022    Date of Service: Pt seen/examined on 4/14/2022      Chief Complaint:    Chief Complaint   Patient presents with    Chest Pain     Midsternal chest pain for 24 hours, states pain goes in to shoulder and neck         History Of Present Illness: The patient is a 58 y.o. male with GERD and diverticulosis who presented to [facility] ED with complaint of chest pain that has been present since 4/12 evening. 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 Located at the left side of his chest and radiates down his left arm and into his back. Describes it as stabbing. Associated with diaphoresis, SOB and nausea. Aggravated by any movement. Alleviated by nothing including nitro or morphine. Has been persistent since onset but states it has subsided somewhat this morning. He endorses a history of GERD and has had a nissen fundoplication in the past. He has also had GB issues that required a cholecystectomy. He has had diverticulitis in the past. He initially felt this could be related to his GI issues but states this is different than any previous GI related issues. He denies any URI symptoms, cough, pain with inspiration, recent new exercises or heavy lifting, abd pain, vomiting, diarrhea, numbness, tingling or swelling. Cardiac risk factors: HTN no, HLD no, DM no, Tobacco use yes x 40 years, achieved cessation 7 years ago, first degree family or personal history of CAD no    The patient denies any known history of connective tissue disease or aneurysms. The patient denies any recent surgery, hospitalization, immobilization, long car/plane trip, active malignancy, long bone fracture, or history of DVT/PE. Since arrival his vitals have been stable. Labs and CXR unremarkable. Trop trend has been negative x3. Presently he complains of a HA related to nitro given.  He admitted for cardiac stress this morning. Past Medical History:        Diagnosis Date    Diverticulitis     GERD (gastroesophageal reflux disease)        Past Surgical History:        Procedure Laterality Date    CHOLECYSTECTOMY      GASTRIC FUNDOPLICATION      KNEE SURGERY Right     multiple       Medications Prior to Admission:    Prior to Admission medications    Medication Sig Start Date End Date Taking? Authorizing Provider   zinc gluconate 50 MG tablet Take 50 mg by mouth daily    Historical Provider, MD   vitamin E 1000 units capsule Take 1,000 Units by mouth 2 times daily    Historical Provider, MD   promethazine-dextromethorphan (PROMETHAZINE-DM) 6.25-15 MG/5ML syrup Take 5 mLs by mouth 4 times daily as needed for Cough (coughing. .caution drowsiness) 12/21/21   Venice Osborne,    ibuprofen (ADVIL;MOTRIN) 800 MG tablet TAKE 1 TABLET BY MOUTH EVERY 6 TO 8 HOURS AS NEEDED FOR MILD TO MODERATE PAIN 2/26/21   Historical Provider, MD   esomeprazole Magnesium (NEXIUM) 20 MG PACK Take by mouth daily    Historical Provider, MD   Magnesium 500 MG TABS Take 1,000 mg by mouth daily    Historical Provider, MD       Allergies:  Pcn [penicillins]    Social History:  The patient currently lives at home with wife    TOBACCO:   reports that he quit smoking about 6 years ago. He has never used smokeless tobacco.  ETOH:   reports current alcohol use.       Family History:   Positive as follows:        Problem Relation Age of Onset    Colon Cancer Father     Parkinsonism Father        REVIEW OF SYSTEMS:       Constitutional: Negative for fever + diaphoresis  HENT: Negative for sore throat   Eyes: Negative for redness   Respiratory: + for dyspnea, negative for cough   Cardiovascular: + for chest pain   Gastrointestinal: Negative for vomiting, diarrhea, +nausea  Genitourinary: Negative for hematuria   Musculoskeletal: Negative for arthralgias   Skin: Negative for rash   Neurological: Negative for syncope   Hematological: Negative for adenopathy   Psychiatric/Behavorial: Negative for anxiety    PHYSICAL EXAM:    BP (!) 100/55   Pulse 67   Temp 97 °F (36.1 °C) (Oral)   Resp 16   Ht 6' 3\" (1.905 m)   Wt 242 lb 12.8 oz (110.1 kg)   SpO2 91%   BMI 30.35 kg/m²     Gen: No distress. Alert. Covering face with arm 2/2 nitro induced headache  Eyes: PERRL. No sclera icterus. No conjunctival injection. ENT: No discharge. Pharynx clear. Neck: No JVD. Trachea midline. Resp: No accessory muscle use. No crackles. No wheezes. No rhonchi. CV: Regular rate. Regular rhythm. No murmur. No rub. No edema. Capillary Refill: Brisk,< 3 seconds   Peripheral Pulses: +2 palpable, equal bilaterally   GI: Non-tender. Non-distended. No masses. No organomegaly. Normal bowel sounds. No hernia. Skin: Warm and dry. No nodule on exposed extremities. No rash on exposed extremities. M/S: No cyanosis. No joint deformity. No clubbing. Neuro: Awake. Grossly nonfocal    Psych: Oriented x 3. No anxiety or agitation. CBC:   Recent Labs     04/13/22  1529 04/14/22  0420   WBC 6.0 5.6   HGB 15.3 14.5   HCT 44.1 42.2   MCV 95.5 95.4    147     BMP:   Recent Labs     04/13/22  1529      K 4.1      CO2 25   BUN 16   CREATININE 0.8     LIVER PROFILE:   Recent Labs     04/13/22  1529   AST 15   ALT 17   BILITOT 0.4   ALKPHOS 78       CARDIAC ENZYMES  Recent Labs     04/13/22  1529 04/13/22  2134 04/14/22  0020   TROPONINI <0.01 <0.01 <0.01       CULTURES  Results for Christina Cleary (MRN [id number]) as of 4/14/2022 08:18   Ref.  Range 4/13/2022 17:00   INFLUENZA A Latest Ref Range: NOT DETECTED  NOT DETECTED   INFLUENZA B Latest Ref Range: NOT DETECTED  NOT DETECTED   SARS-CoV-2 RNA, RT PCR Latest Ref Range: NOT DETECTED  NOT DETECTED       EKG:  I have reviewed the EKG with the following interpretation:   Sinus bradycardia  Otherwise normal ECG  When compared with ECG of 13-APR-2022 15:17  No significant change was found    RADIOLOGY  XR CHEST (2 VW) Final Result   Clear lungs. NM Cardiac Stress Test Nuclear Imaging    (Results Pending)             Alexey Nathan MD have reviewed the chart on Alvaro Leslie and personally interviewed and examined patient, reviewed the data (labs and imaging) and after discussion with my PA formulated the plan. Agree with note with the following edits. HPI:    59 yo  Male presenting with C/o CP .    pain precipitated in certain positions . trop neg . stress test completed   I reviewed the patient's Past Medical History, Past Surgical History, Medications, and Allergies. Physical exam:    /81   Pulse 68   Temp 98.2 °F (36.8 °C) (Oral)   Resp 18   Ht 6' 3\" (1.905 m)   Wt 242 lb 12.8 oz (110.1 kg)   SpO2 94%   BMI 30.35 kg/m²   Gen: No distress. Alert. Eyes: PERRL. No sclera icterus. No conjunctival injection. ENT: No discharge. Pharynx clear. Neck: Trachea midline. Normal thyroid. Resp: No accessory muscle use. No crackles. No wheezes. No rhonchi. No dullness on percussion. CV: Regular rate. Regular rhythm. No murmur or rub. No edema. GI: Non-tender. Non-distended. No masses. No organomegaly. Normal bowel sounds. No hernia. Skin: Warm and dry. No nodule on exposed extremities. No rash on exposed extremities. Lymph: No cervical LAD. No supraclavicular LAD. M/S: No cyanosis. No joint deformity. No clubbing. Neuro: Awake. Reflexes 2+ symmetric bilaterally. Moves all 4 extremities, non focal  Psych: Oriented x 3. No anxiety or agitation.        Electronically signed by Tamie Geronimo MD  on 4/14/2022 5:49 PM     ASSESSMENT/PLAN:  #Chest pain  -Many typical features  -Patient does not have many risk factors other than smoking hx, although he admits to infrequent OP medical follow up  -Serial troponins neg x3  -EKGs with no signs of ischemia  -Check lipid panel  -ASA and statin started at admission  -Cardiac stress this am    #GERD  -S/p nissen fundoplication approx 10 years ago  -On PPI    #Diverticulosis  -No s/s of acute infection    DVT Prophylaxis: Lovenox  Diet: Diet NPO  Code Status: Full Code    Arian Umanzor PA-C  4/14/2022 8:54 AM       agree with plan of care as above . stress test completed . This is normal .       Summary    There is no definite evidence of stress induced ischemia. Note tracer    activity in small bowel adjacent and overlying inferior wall may affect    interpretation. Hyperdynamic LV systolic function with BD>38% with uniform    wall motion. Clinical correlation recommended.            pt can be discharged home . CP likely musculoskeletal in etiology        Discharge     Date : 4/14/2022  Condition : stable   F/U with PCP . Medication List      CONTINUE taking these medications    esomeprazole Magnesium 20 MG Pack  Commonly known as: NEXIUM     ibuprofen 800 MG tablet  Commonly known as: ADVIL;MOTRIN     Magnesium 500 MG Tabs     promethazine-dextromethorphan 6.25-15 MG/5ML syrup  Commonly known as: PROMETHAZINE-DM  Take 5 mLs by mouth 4 times daily as needed for Cough (coughing. .caution drowsiness)     vitamin E 1000 units capsule     zinc gluconate 50 MG tablet                Electronically signed by Katherine Zambrano MD  on 4/14/2022 5:49 PM

## 2023-02-01 ENCOUNTER — OFFICE VISIT (OUTPATIENT)
Dept: FAMILY MEDICINE CLINIC | Age: 63
End: 2023-02-01
Payer: COMMERCIAL

## 2023-02-01 VITALS
OXYGEN SATURATION: 98 % | BODY MASS INDEX: 31 KG/M2 | SYSTOLIC BLOOD PRESSURE: 134 MMHG | DIASTOLIC BLOOD PRESSURE: 86 MMHG | WEIGHT: 248 LBS | RESPIRATION RATE: 16 BRPM | TEMPERATURE: 97.1 F | HEART RATE: 57 BPM

## 2023-02-01 DIAGNOSIS — Z13.220 SCREENING CHOLESTEROL LEVEL: ICD-10-CM

## 2023-02-01 DIAGNOSIS — Z12.5 PROSTATE CANCER SCREENING: ICD-10-CM

## 2023-02-01 DIAGNOSIS — M17.11 PRIMARY OSTEOARTHRITIS OF RIGHT KNEE: ICD-10-CM

## 2023-02-01 DIAGNOSIS — Z01.818 PRE-OP EXAMINATION: Primary | ICD-10-CM

## 2023-02-01 LAB
BILIRUBIN, POC: NEGATIVE
BLOOD URINE, POC: NEGATIVE
CHOLESTEROL, TOTAL: 263 MG/DL (ref 0–199)
CLARITY, POC: NORMAL
COLOR, POC: NORMAL
GLUCOSE URINE, POC: NEGATIVE
HDLC SERPL-MCNC: 43 MG/DL (ref 40–60)
KETONES, POC: NEGATIVE
LDL CHOLESTEROL CALCULATED: 184 MG/DL
LEUKOCYTE EST, POC: NEGATIVE
NITRITE, POC: NEGATIVE
PH, POC: 6.5
PROSTATE SPECIFIC ANTIGEN: 1.98 NG/ML (ref 0–4)
PROTEIN, POC: NEGATIVE
SPECIFIC GRAVITY, POC: 1.02
TRIGL SERPL-MCNC: 179 MG/DL (ref 0–150)
UROBILINOGEN, POC: NORMAL
VLDLC SERPL CALC-MCNC: 36 MG/DL

## 2023-02-01 PROCEDURE — 81003 URINALYSIS AUTO W/O SCOPE: CPT | Performed by: NURSE PRACTITIONER

## 2023-02-01 PROCEDURE — 99214 OFFICE O/P EST MOD 30 MIN: CPT | Performed by: NURSE PRACTITIONER

## 2023-02-01 RX ORDER — MELOXICAM 15 MG/1
15 TABLET ORAL DAILY
COMMUNITY

## 2023-02-01 SDOH — ECONOMIC STABILITY: HOUSING INSECURITY
IN THE LAST 12 MONTHS, WAS THERE A TIME WHEN YOU DID NOT HAVE A STEADY PLACE TO SLEEP OR SLEPT IN A SHELTER (INCLUDING NOW)?: NO

## 2023-02-01 SDOH — ECONOMIC STABILITY: FOOD INSECURITY: WITHIN THE PAST 12 MONTHS, YOU WORRIED THAT YOUR FOOD WOULD RUN OUT BEFORE YOU GOT MONEY TO BUY MORE.: NEVER TRUE

## 2023-02-01 SDOH — ECONOMIC STABILITY: INCOME INSECURITY: HOW HARD IS IT FOR YOU TO PAY FOR THE VERY BASICS LIKE FOOD, HOUSING, MEDICAL CARE, AND HEATING?: NOT HARD AT ALL

## 2023-02-01 SDOH — ECONOMIC STABILITY: FOOD INSECURITY: WITHIN THE PAST 12 MONTHS, THE FOOD YOU BOUGHT JUST DIDN'T LAST AND YOU DIDN'T HAVE MONEY TO GET MORE.: NEVER TRUE

## 2023-02-01 ASSESSMENT — PATIENT HEALTH QUESTIONNAIRE - PHQ9
SUM OF ALL RESPONSES TO PHQ QUESTIONS 1-9: 0
2. FEELING DOWN, DEPRESSED OR HOPELESS: 0
1. LITTLE INTEREST OR PLEASURE IN DOING THINGS: 0
DEPRESSION UNABLE TO ASSESS: FUNCTIONAL CAPACITY MOTIVATION LIMITS ACCURACY
SUM OF ALL RESPONSES TO PHQ9 QUESTIONS 1 & 2: 0
SUM OF ALL RESPONSES TO PHQ QUESTIONS 1-9: 0

## 2023-02-01 ASSESSMENT — ENCOUNTER SYMPTOMS
DIARRHEA: 0
COUGH: 0
VOMITING: 0
NAUSEA: 0
SHORTNESS OF BREATH: 0

## 2023-02-01 ASSESSMENT — ANXIETY QUESTIONNAIRES
1. FEELING NERVOUS, ANXIOUS, OR ON EDGE: 0
7. FEELING AFRAID AS IF SOMETHING AWFUL MIGHT HAPPEN: 0
3. WORRYING TOO MUCH ABOUT DIFFERENT THINGS: 0
4. TROUBLE RELAXING: 0
6. BECOMING EASILY ANNOYED OR IRRITABLE: 0
IF YOU CHECKED OFF ANY PROBLEMS ON THIS QUESTIONNAIRE, HOW DIFFICULT HAVE THESE PROBLEMS MADE IT FOR YOU TO DO YOUR WORK, TAKE CARE OF THINGS AT HOME, OR GET ALONG WITH OTHER PEOPLE: NOT DIFFICULT AT ALL
5. BEING SO RESTLESS THAT IT IS HARD TO SIT STILL: 0
2. NOT BEING ABLE TO STOP OR CONTROL WORRYING: 0
GAD7 TOTAL SCORE: 0

## 2023-02-01 NOTE — PATIENT INSTRUCTIONS
Eat and drink nothing after midnight the night before the planned procedure. Stop all aspirin, ibuprofen, aleve (tylenol/acetaminophen is ok) for seven days prior to the procedure.     Stop the meloxicam and vitamin e 7 days prior to surgery

## 2023-02-01 NOTE — PROGRESS NOTES
[facility]   Telephone: [phone]  Fax: [phone]  Preoperative History & Physical        DIAGNOSIS:  Primary osteoarthritis of right knee    PROCEDURE:  Total right knee replacement      History Obtained From:  patient    HISTORY OF PRESENT ILLNESS:    The patient is a 58 y.o. male with significant past medical history of GERD, diverticulitis who presents for preoperative examination for above procedure. Surgery is scheduled for 02/14/2023 with Dr. Wilbert Giles at [facility]. Chronic right knee pain for many years, has had multiple surgeries on this knee. His pain has been worsening. He has limited ROM of the knee. He has blood work orders from his surgeon which will be done today. Past Medical History:   Diagnosis Date    Diverticulitis     GERD (gastroesophageal reflux disease)      Past Surgical History:   Procedure Laterality Date    CHOLECYSTECTOMY      GASTRIC FUNDOPLICATION      KNEE SURGERY Right     multiple     Current Outpatient Medications   Medication Sig Dispense Refill    meloxicam (MOBIC) 15 MG tablet Take 15 mg by mouth daily      esomeprazole Magnesium (NEXIUM) 20 MG PACK Take by mouth daily      zinc gluconate 50 MG tablet Take 50 mg by mouth daily (Patient not taking: Reported on 2/1/2023)      vitamin E 1000 units capsule Take 1,000 Units by mouth 2 times daily (Patient not taking: Reported on 2/1/2023)      ibuprofen (ADVIL;MOTRIN) 800 MG tablet TAKE 1 TABLET BY MOUTH EVERY 6 TO 8 HOURS AS NEEDED FOR MILD TO MODERATE PAIN (Patient not taking: Reported on 2/1/2023)      Magnesium 500 MG TABS Take 1,000 mg by mouth daily (Patient not taking: Reported on 2/1/2023)       No current facility-administered medications for this visit.        Allergies:  Pcn [penicillins]  History of allergic reaction to anesthesia:  No  Planned anesthesia: General  Known anesthesia problems: None   Bleeding risk: No recent or remote history of abnormal bleeding  Personal or FH of DVT/PE: No    Patient objection to receiving blood products: No    Social History     Tobacco Use   Smoking Status Former    Packs/day: 0.25    Years: 1.00    Pack years: 0.25    Types: Cigarettes    Start date: 12    Quit date:     Years since quittin.0   Smokeless Tobacco Never     Family History   Problem Relation Age of Onset    Colon Cancer Father     Parkinsonism Father        REVIEW OF SYSTEMS:    Review of Systems   Constitutional:  Negative for fatigue and fever. Respiratory:  Negative for cough and shortness of breath. Cardiovascular:  Negative for chest pain and leg swelling. Gastrointestinal:  Negative for diarrhea, nausea and vomiting. Neurological:  Negative for dizziness and headaches. PHYSICAL EXAM:    /86 (Site: Left Upper Arm, Position: Sitting)   Pulse 57   Temp 97.1 °F (36.2 °C) (Temporal)   Resp 16   Wt 248 lb (112.5 kg)   SpO2 98%   BMI 31.00 kg/m²     Physical Exam  Vitals and nursing note reviewed. Constitutional:       General: He is not in acute distress. Appearance: Normal appearance. He is obese. He is not ill-appearing, toxic-appearing or diaphoretic. Comments: BMI 31.0   HENT:      Head: Normocephalic and atraumatic. Right Ear: External ear normal.   Cardiovascular:      Rate and Rhythm: Normal rate and regular rhythm. Heart sounds: Normal heart sounds. No murmur heard. No friction rub. No gallop. Pulmonary:      Effort: Pulmonary effort is normal. No respiratory distress. Breath sounds: Normal breath sounds. No stridor. No wheezing, rhonchi or rales. Neurological:      General: No focal deficit present. Mental Status: He is alert and oriented to person, place, and time. Mental status is at baseline. Cranial Nerves: No cranial nerve deficit.         DATA:  EKG:  Date:  2022  I have reviewed EKG with the following interpretation:  Impression:  NSR       CBC:   Lab Results   Component Value Date/Time    WBC 3.8 02/01/2023 08:50 AM    RBC 4.81 02/01/2023 08:50 AM    HGB 15.4 02/01/2023 08:50 AM    HCT 46.9 02/01/2023 08:50 AM    MCV 97.5 02/01/2023 08:50 AM    MCH 32.0 02/01/2023 08:50 AM    MCHC 32.8 02/01/2023 08:50 AM    RDW 13.4 02/01/2023 08:50 AM     02/01/2023 08:50 AM    MPV 9.6 02/01/2023 08:50 AM     BMP:    Lab Results   Component Value Date/Time     02/01/2023 08:50 AM    K 4.8 02/01/2023 08:50 AM    K 4.1 04/13/2022 03:29 PM     02/01/2023 08:50 AM    CO2 26 02/01/2023 08:50 AM    BUN 16 02/01/2023 08:50 AM    LABALBU 4.4 02/01/2023 08:50 AM    CREATININE 0.7 02/01/2023 08:50 AM    CALCIUM 9.6 02/01/2023 08:50 AM    GFRAA >60 04/13/2022 03:29 PM    LABGLOM >60 02/01/2023 08:50 AM    GLUCOSE 99 02/01/2023 08:50 AM     Results for POC orders placed in visit on 02/01/23   POCT Urinalysis No Micro (Auto)   Result Value Ref Range    Color, UA      Clarity, UA      Glucose, UA POC Negative     Bilirubin, UA Negative     Ketones, UA Negative     Spec Grav, UA 1.025     Blood, UA POC Negative     pH, UA 6.5     Protein, UA POC Negative     Urobilinogen, UA 0.2 E.U./dL     Leukocytes, UA Negative     Nitrite, UA Negative        ASSESSMENT AND PLAN:  1. Pre-op examination  - Culture, Urine  - POCT Urinalysis No Micro (Auto)    2. Primary osteoarthritis of right knee    3. Prostate cancer screening  - PSA Screening; Future    4. Screening cholesterol level  - Lipid Panel; Future    1. Preoperative workup as follows: Labs per surgeon's orders  2. Change in medication regimen before surgery: Discontinue NSAIDs (meloxicam) 7 days before surgery  3.  Prophylaxis for cardiac events with perioperative beta-blockers: Not indicated  ACC/AHA indications for pre-operative beta-blocker use:    Vascular surgery with history of postitive stress test  Intermediate or high risk surgery with history of CAD   Intermediate or high risk surgery with multiple clinical predictors of CAD- 2 of the following: history of compensated or prior heart failure, history of cerebrovascular disease, DM, or renal insufficiency    Routine administration of higher-dose, long-acting metoprolol in beta-blocker-naïve patients on the day of surgery, and in the absence of dose titration is associated with an overall increase in mortality. Beta-blockers should be started days to weeks prior to surgery and titrated to pulse < 70.  4. Deep vein thrombosis prophylaxis: regimen to be chosen by surgical team  5. No contraindications to planned surgery      SAWYER Paul CNP, CNP    MMA [place].  [address]  [phone]

## 2023-02-02 LAB — URINE CULTURE, ROUTINE: NORMAL

## 2023-02-02 RX ORDER — ATORVASTATIN CALCIUM 20 MG/1
20 TABLET, FILM COATED ORAL DAILY
Qty: 90 TABLET | Refills: 1 | Status: SHIPPED | OUTPATIENT
Start: 2023-02-02

## 2023-08-02 RX ORDER — ATORVASTATIN CALCIUM 20 MG/1
TABLET, FILM COATED ORAL
Qty: 90 TABLET | Refills: 1 | Status: SHIPPED | OUTPATIENT
Start: 2023-08-02

## 2024-02-08 ENCOUNTER — OFFICE VISIT (OUTPATIENT)
Dept: FAMILY MEDICINE CLINIC | Age: 64
End: 2024-02-08
Payer: COMMERCIAL

## 2024-02-08 VITALS
DIASTOLIC BLOOD PRESSURE: 82 MMHG | HEART RATE: 71 BPM | OXYGEN SATURATION: 97 % | TEMPERATURE: 97 F | BODY MASS INDEX: 31.46 KG/M2 | HEIGHT: 75 IN | SYSTOLIC BLOOD PRESSURE: 128 MMHG | RESPIRATION RATE: 15 BRPM | WEIGHT: 253 LBS

## 2024-02-08 DIAGNOSIS — H10.023 PINK EYE DISEASE OF BOTH EYES: ICD-10-CM

## 2024-02-08 DIAGNOSIS — K21.9 GASTROESOPHAGEAL REFLUX DISEASE, UNSPECIFIED WHETHER ESOPHAGITIS PRESENT: ICD-10-CM

## 2024-02-08 DIAGNOSIS — Z12.11 SCREEN FOR COLON CANCER: ICD-10-CM

## 2024-02-08 DIAGNOSIS — J02.9 SORE THROAT: ICD-10-CM

## 2024-02-08 DIAGNOSIS — J06.9 VIRAL URI: Primary | ICD-10-CM

## 2024-02-08 LAB
Lab: 0
PERFORMING INSTRUMENT: NORMAL
QC PASS/FAIL: NORMAL
S PYO AG THROAT QL: NORMAL
SARS-COV-2, POC: NORMAL

## 2024-02-08 PROCEDURE — G8484 FLU IMMUNIZE NO ADMIN: HCPCS | Performed by: NURSE PRACTITIONER

## 2024-02-08 PROCEDURE — 87426 SARSCOV CORONAVIRUS AG IA: CPT | Performed by: NURSE PRACTITIONER

## 2024-02-08 PROCEDURE — 1036F TOBACCO NON-USER: CPT | Performed by: NURSE PRACTITIONER

## 2024-02-08 PROCEDURE — G8417 CALC BMI ABV UP PARAM F/U: HCPCS | Performed by: NURSE PRACTITIONER

## 2024-02-08 PROCEDURE — 99214 OFFICE O/P EST MOD 30 MIN: CPT | Performed by: NURSE PRACTITIONER

## 2024-02-08 PROCEDURE — 87880 STREP A ASSAY W/OPTIC: CPT | Performed by: NURSE PRACTITIONER

## 2024-02-08 PROCEDURE — G8427 DOCREV CUR MEDS BY ELIG CLIN: HCPCS | Performed by: NURSE PRACTITIONER

## 2024-02-08 PROCEDURE — 3017F COLORECTAL CA SCREEN DOC REV: CPT | Performed by: NURSE PRACTITIONER

## 2024-02-08 RX ORDER — DICLOFENAC SODIUM 75 MG/1
1 TABLET, DELAYED RELEASE ORAL 2 TIMES DAILY
COMMUNITY
Start: 2023-06-07 | End: 2024-02-08

## 2024-02-08 RX ORDER — PREGABALIN 75 MG/1
75 CAPSULE ORAL 2 TIMES DAILY
COMMUNITY
Start: 2023-05-08 | End: 2024-02-08

## 2024-02-08 RX ORDER — CIPROFLOXACIN HYDROCHLORIDE 3.5 MG/ML
1 SOLUTION/ DROPS TOPICAL 4 TIMES DAILY
Qty: 5 ML | Refills: 0 | Status: SHIPPED | OUTPATIENT
Start: 2024-02-08

## 2024-02-08 RX ORDER — GABAPENTIN 300 MG/1
300 CAPSULE ORAL 2 TIMES DAILY PRN
COMMUNITY
Start: 2023-04-13 | End: 2024-02-08

## 2024-02-08 RX ORDER — PREDNISONE 10 MG/1
TABLET ORAL
COMMUNITY
Start: 2023-04-24 | End: 2024-02-08

## 2024-02-08 RX ORDER — FLUTICASONE PROPIONATE 50 MCG
SPRAY, SUSPENSION (ML) NASAL
Qty: 16 G | Refills: 0 | Status: SHIPPED | OUTPATIENT
Start: 2024-02-08

## 2024-02-08 SDOH — ECONOMIC STABILITY: INCOME INSECURITY: HOW HARD IS IT FOR YOU TO PAY FOR THE VERY BASICS LIKE FOOD, HOUSING, MEDICAL CARE, AND HEATING?: NOT HARD AT ALL

## 2024-02-08 SDOH — ECONOMIC STABILITY: FOOD INSECURITY: WITHIN THE PAST 12 MONTHS, THE FOOD YOU BOUGHT JUST DIDN'T LAST AND YOU DIDN'T HAVE MONEY TO GET MORE.: NEVER TRUE

## 2024-02-08 SDOH — ECONOMIC STABILITY: FOOD INSECURITY: WITHIN THE PAST 12 MONTHS, YOU WORRIED THAT YOUR FOOD WOULD RUN OUT BEFORE YOU GOT MONEY TO BUY MORE.: NEVER TRUE

## 2024-02-08 ASSESSMENT — ENCOUNTER SYMPTOMS
PHOTOPHOBIA: 0
COUGH: 1
TROUBLE SWALLOWING: 0
WHEEZING: 0
EYE DISCHARGE: 1
ABDOMINAL PAIN: 1
SINUS PAIN: 0
CHEST TIGHTNESS: 0
EYE PAIN: 0
EYE REDNESS: 1
CONSTIPATION: 0
VOMITING: 0
NAUSEA: 0
DIARRHEA: 0
SHORTNESS OF BREATH: 0
SORE THROAT: 1
SINUS PRESSURE: 0
EYE ITCHING: 1

## 2024-02-08 ASSESSMENT — ANXIETY QUESTIONNAIRES
3. WORRYING TOO MUCH ABOUT DIFFERENT THINGS: 0
GAD7 TOTAL SCORE: 0
5. BEING SO RESTLESS THAT IT IS HARD TO SIT STILL: 0
1. FEELING NERVOUS, ANXIOUS, OR ON EDGE: 0
IF YOU CHECKED OFF ANY PROBLEMS ON THIS QUESTIONNAIRE, HOW DIFFICULT HAVE THESE PROBLEMS MADE IT FOR YOU TO DO YOUR WORK, TAKE CARE OF THINGS AT HOME, OR GET ALONG WITH OTHER PEOPLE: NOT DIFFICULT AT ALL
6. BECOMING EASILY ANNOYED OR IRRITABLE: 0
7. FEELING AFRAID AS IF SOMETHING AWFUL MIGHT HAPPEN: 0
4. TROUBLE RELAXING: 0
2. NOT BEING ABLE TO STOP OR CONTROL WORRYING: 0

## 2024-02-08 ASSESSMENT — PATIENT HEALTH QUESTIONNAIRE - PHQ9
2. FEELING DOWN, DEPRESSED OR HOPELESS: 0
SUM OF ALL RESPONSES TO PHQ QUESTIONS 1-9: 0
SUM OF ALL RESPONSES TO PHQ9 QUESTIONS 1 & 2: 0
SUM OF ALL RESPONSES TO PHQ QUESTIONS 1-9: 0
1. LITTLE INTEREST OR PLEASURE IN DOING THINGS: 0
SUM OF ALL RESPONSES TO PHQ QUESTIONS 1-9: 0
SUM OF ALL RESPONSES TO PHQ QUESTIONS 1-9: 0

## 2024-02-08 NOTE — PROGRESS NOTES
colon cancer  Z12.11 Jarrett Bailey [facility], [facility]           Plan    Viral uri with strong allergy componet  Push fluids and reviewed supportive care  Start flonase bid x 7 days then decrease to once daily  Claritin/zyrtec or allegera daily  Due to trauma- start cipro eye drop, avoid touching face  Discussed GI symptoms and need for colon cancer screening with family history- referral placed  Overdue for physical- encouraged to schedule      Orders Placed This Encounter   Procedures    Jarrett Bailey MD, [facility]     Referral Priority:   Routine     Referral Type:   Eval and Treat     Referral Reason:   Specialty Services Required     Referred to Provider:   Jarrett Saravia MD     Requested Specialty:   Gastroenterology     Number of Visits Requested:   1    POCT rapid strep A    POCT COVID-19, Antigen     Order Specific Question:   Pregnant:     Answer:   No       Orders Placed This Encounter   Medications    fluticasone (FLONASE) 50 MCG/ACT nasal spray     Sig: One spray each nostril twice a day for 7 days. Decrease to once daily     Dispense:  16 g     Refill:  0    ciprofloxacin (CILOXAN) 0.3 % ophthalmic solution     Sig: Place 1 drop into both eyes in the morning, at noon, in the evening, and at bedtime     Dispense:  5 mL     Refill:  0       Patient Education:  plan    Return in about 6 weeks (around 3/21/2024) for annual physical.          
No

## 2024-02-08 NOTE — PATIENT INSTRUCTIONS
Debrox- for excessive ear wax  Start claritin/zyrtec/allegra daily  Flonase twice a day for 7 days then once daily

## 2024-02-21 ENCOUNTER — OFFICE VISIT (OUTPATIENT)
Dept: FAMILY MEDICINE CLINIC | Age: 64
End: 2024-02-21
Payer: COMMERCIAL

## 2024-02-21 VITALS
RESPIRATION RATE: 16 BRPM | OXYGEN SATURATION: 96 % | TEMPERATURE: 97.1 F | SYSTOLIC BLOOD PRESSURE: 118 MMHG | HEART RATE: 62 BPM | WEIGHT: 257 LBS | BODY MASS INDEX: 32.12 KG/M2 | DIASTOLIC BLOOD PRESSURE: 74 MMHG

## 2024-02-21 DIAGNOSIS — E78.00 HYPERCHOLESTEREMIA: ICD-10-CM

## 2024-02-21 DIAGNOSIS — N52.9 ERECTILE DYSFUNCTION, UNSPECIFIED ERECTILE DYSFUNCTION TYPE: ICD-10-CM

## 2024-02-21 DIAGNOSIS — K21.9 GASTROESOPHAGEAL REFLUX DISEASE, UNSPECIFIED WHETHER ESOPHAGITIS PRESENT: ICD-10-CM

## 2024-02-21 DIAGNOSIS — Z00.00 ANNUAL PHYSICAL EXAM: ICD-10-CM

## 2024-02-21 DIAGNOSIS — Z12.5 PROSTATE CANCER SCREENING: ICD-10-CM

## 2024-02-21 DIAGNOSIS — Z00.00 ANNUAL PHYSICAL EXAM: Primary | ICD-10-CM

## 2024-02-21 LAB
ALBUMIN SERPL-MCNC: 4.6 G/DL (ref 3.4–5)
ALBUMIN/GLOB SERPL: 1.8 {RATIO} (ref 1.1–2.2)
ALP SERPL-CCNC: 86 U/L (ref 40–129)
ALT SERPL-CCNC: 21 U/L (ref 10–40)
ANION GAP SERPL CALCULATED.3IONS-SCNC: 10 MMOL/L (ref 3–16)
AST SERPL-CCNC: 17 U/L (ref 15–37)
BASOPHILS # BLD: 0 K/UL (ref 0–0.2)
BASOPHILS NFR BLD: 0.8 %
BILIRUB SERPL-MCNC: 0.6 MG/DL (ref 0–1)
BUN SERPL-MCNC: 11 MG/DL (ref 7–20)
CALCIUM SERPL-MCNC: 9.6 MG/DL (ref 8.3–10.6)
CHLORIDE SERPL-SCNC: 102 MMOL/L (ref 99–110)
CHOLEST SERPL-MCNC: 171 MG/DL (ref 0–199)
CO2 SERPL-SCNC: 28 MMOL/L (ref 21–32)
CREAT SERPL-MCNC: 0.8 MG/DL (ref 0.8–1.3)
DEPRECATED RDW RBC AUTO: 13.7 % (ref 12.4–15.4)
EOSINOPHIL # BLD: 0 K/UL (ref 0–0.6)
EOSINOPHIL NFR BLD: 1 %
GFR SERPLBLD CREATININE-BSD FMLA CKD-EPI: >60 ML/MIN/{1.73_M2}
GLUCOSE SERPL-MCNC: 103 MG/DL (ref 70–99)
HCT VFR BLD AUTO: 44.9 % (ref 40.5–52.5)
HDLC SERPL-MCNC: 40 MG/DL (ref 40–60)
HGB BLD-MCNC: 15.6 G/DL (ref 13.5–17.5)
LDLC SERPL CALC-MCNC: 103 MG/DL
LYMPHOCYTES # BLD: 1.2 K/UL (ref 1–5.1)
LYMPHOCYTES NFR BLD: 25.9 %
MCH RBC QN AUTO: 32.7 PG (ref 26–34)
MCHC RBC AUTO-ENTMCNC: 34.6 G/DL (ref 31–36)
MCV RBC AUTO: 94.5 FL (ref 80–100)
MONOCYTES # BLD: 0.4 K/UL (ref 0–1.3)
MONOCYTES NFR BLD: 7.6 %
NEUTROPHILS # BLD: 3 K/UL (ref 1.7–7.7)
NEUTROPHILS NFR BLD: 64.7 %
PLATELET # BLD AUTO: 189 K/UL (ref 135–450)
PMV BLD AUTO: 9.5 FL (ref 5–10.5)
POTASSIUM SERPL-SCNC: 4.4 MMOL/L (ref 3.5–5.1)
PROT SERPL-MCNC: 7.2 G/DL (ref 6.4–8.2)
PSA SERPL DL<=0.01 NG/ML-MCNC: 2.04 NG/ML (ref 0–4)
RBC # BLD AUTO: 4.76 M/UL (ref 4.2–5.9)
SODIUM SERPL-SCNC: 140 MMOL/L (ref 136–145)
TRIGL SERPL-MCNC: 141 MG/DL (ref 0–150)
VLDLC SERPL CALC-MCNC: 28 MG/DL
WBC # BLD AUTO: 4.7 K/UL (ref 4–11)

## 2024-02-21 PROCEDURE — 99396 PREV VISIT EST AGE 40-64: CPT | Performed by: NURSE PRACTITIONER

## 2024-02-21 PROCEDURE — G8484 FLU IMMUNIZE NO ADMIN: HCPCS | Performed by: NURSE PRACTITIONER

## 2024-02-21 RX ORDER — PANTOPRAZOLE SODIUM 40 MG/1
40 TABLET, DELAYED RELEASE ORAL
Qty: 90 TABLET | Refills: 3 | Status: SHIPPED | OUTPATIENT
Start: 2024-02-21

## 2024-02-21 SDOH — ECONOMIC STABILITY: FOOD INSECURITY: WITHIN THE PAST 12 MONTHS, YOU WORRIED THAT YOUR FOOD WOULD RUN OUT BEFORE YOU GOT MONEY TO BUY MORE.: NEVER TRUE

## 2024-02-21 SDOH — ECONOMIC STABILITY: INCOME INSECURITY: HOW HARD IS IT FOR YOU TO PAY FOR THE VERY BASICS LIKE FOOD, HOUSING, MEDICAL CARE, AND HEATING?: NOT HARD AT ALL

## 2024-02-21 SDOH — ECONOMIC STABILITY: FOOD INSECURITY: WITHIN THE PAST 12 MONTHS, THE FOOD YOU BOUGHT JUST DIDN'T LAST AND YOU DIDN'T HAVE MONEY TO GET MORE.: NEVER TRUE

## 2024-02-21 ASSESSMENT — PATIENT HEALTH QUESTIONNAIRE - PHQ9
SUM OF ALL RESPONSES TO PHQ QUESTIONS 1-9: 0
SUM OF ALL RESPONSES TO PHQ QUESTIONS 1-9: 0
DEPRESSION UNABLE TO ASSESS: FUNCTIONAL CAPACITY MOTIVATION LIMITS ACCURACY
1. LITTLE INTEREST OR PLEASURE IN DOING THINGS: 0
SUM OF ALL RESPONSES TO PHQ QUESTIONS 1-9: 0
SUM OF ALL RESPONSES TO PHQ QUESTIONS 1-9: 0
SUM OF ALL RESPONSES TO PHQ9 QUESTIONS 1 & 2: 0
2. FEELING DOWN, DEPRESSED OR HOPELESS: 0

## 2024-02-21 ASSESSMENT — ANXIETY QUESTIONNAIRES
5. BEING SO RESTLESS THAT IT IS HARD TO SIT STILL: 0
4. TROUBLE RELAXING: 0
IF YOU CHECKED OFF ANY PROBLEMS ON THIS QUESTIONNAIRE, HOW DIFFICULT HAVE THESE PROBLEMS MADE IT FOR YOU TO DO YOUR WORK, TAKE CARE OF THINGS AT HOME, OR GET ALONG WITH OTHER PEOPLE: NOT DIFFICULT AT ALL
2. NOT BEING ABLE TO STOP OR CONTROL WORRYING: 0
3. WORRYING TOO MUCH ABOUT DIFFERENT THINGS: 0
GAD7 TOTAL SCORE: 0
7. FEELING AFRAID AS IF SOMETHING AWFUL MIGHT HAPPEN: 0
6. BECOMING EASILY ANNOYED OR IRRITABLE: 0
1. FEELING NERVOUS, ANXIOUS, OR ON EDGE: 0

## 2024-02-21 ASSESSMENT — ENCOUNTER SYMPTOMS
DIARRHEA: 0
NAUSEA: 0
SHORTNESS OF BREATH: 0
COUGH: 0
VOMITING: 0

## 2024-02-21 NOTE — PROGRESS NOTES
- You must understand that you have received an Urgent Care treatment only and that you may be released before all of your medical problems are known or treated.   - You, the patient, will arrange for follow up care as instructed.   - If your condition worsens or fails to improve we recommend that you receive another evaluation at the ER immediately or contact your PCP to discuss your concerns.   - You can call [phone] or [phone] to help schedule an appointment with the appropriate provider.    Take OTC ibuprofen 400-800 mg 3 times per day. Max dose 3200 mg from all sources per day. Or Tylenol 500-1000 mg 3 times per day. Max 4000 mg from all sources per day.   Rest as much as possible  Alternate heat and ice. Apply heat for 20-30 minutes, perform gentle range of motion exercises, and then apply ice for 15 minutes. Do this 3-4 times per day.      
No murmur heard.     No friction rub. No gallop.   Pulmonary:      Effort: Pulmonary effort is normal. No respiratory distress.      Breath sounds: Normal breath sounds. No stridor. No wheezing, rhonchi or rales.   Abdominal:      General: Bowel sounds are normal. There is no distension.      Palpations: Abdomen is soft. There is no mass.      Tenderness: There is no abdominal tenderness. There is no guarding.   Musculoskeletal:      Cervical back: Normal range of motion and neck supple. No rigidity or tenderness.   Lymphadenopathy:      Cervical: No cervical adenopathy.   Skin:     General: Skin is warm and dry.      Coloration: Skin is not jaundiced or pale.   Neurological:      General: No focal deficit present.      Mental Status: He is alert and oriented to person, place, and time. Mental status is at baseline.      Cranial Nerves: No cranial nerve deficit.   Psychiatric:         Mood and Affect: Mood normal.         Behavior: Behavior normal.         Thought Content: Thought content normal.         Judgment: Judgment normal.             Latest Ref Rng & Units 2/1/2023     8:50 AM 4/14/2022     4:20 AM 4/13/2022     3:29 PM   LAB PRIMARY CARE   A1C See comment % 5.2      A1C POC See comment % 5.2      GLU random 70 - 99 mg/dL 99   119    CHOL 0 - 199 mg/dL 263  183     TRIG 0 - 150 mg/dL 179  77     HDL 40 - 60 mg/dL 43  43     LDL CALC <100 mg/dL 184  125      - 145 mmol/L 142   141    K 3.5 - 5.1 mmol/L 4.8   4.1    BUN 7 - 20 mg/dL 16   16    CR 0.8 - 1.3 mg/dL 0.7   0.8    CA 8.3 - 10.6 mg/dL 9.6   9.3    ALT 10 - 40 U/L   17    AST 15 - 37 U/L   15    PSA 0.00 - 4.00 ng/mL 1.98      HGB 13.5 - 17.5 g/dL 15.4  14.5  15.3        Lab Results   Component Value Date/Time    CHOL 263 02/01/2023 08:50 AM    CHOL 183 04/14/2022 04:20 AM    TRIG 179 02/01/2023 08:50 AM    TRIG 77 04/14/2022 04:20 AM    HDL 43 02/01/2023 08:50 AM    HDL 43 04/14/2022 04:20 AM    LDLCALC 184 02/01/2023 08:50 AM    LDLCALC 125

## 2024-02-22 RX ORDER — ATORVASTATIN CALCIUM 20 MG/1
20 TABLET, FILM COATED ORAL DAILY
Qty: 90 TABLET | Refills: 3 | Status: SHIPPED | OUTPATIENT
Start: 2024-02-22

## 2024-04-09 ENCOUNTER — HOSPITAL ENCOUNTER (OUTPATIENT)
Dept: GENERAL RADIOLOGY | Age: 64
Discharge: HOME | End: 2024-04-09
Payer: COMMERCIAL

## 2024-04-09 ENCOUNTER — OFFICE VISIT (OUTPATIENT)
Dept: FAMILY MEDICINE CLINIC | Age: 64
End: 2024-04-09
Payer: COMMERCIAL

## 2024-04-09 VITALS
SYSTOLIC BLOOD PRESSURE: 128 MMHG | WEIGHT: 256 LBS | DIASTOLIC BLOOD PRESSURE: 84 MMHG | OXYGEN SATURATION: 95 % | HEART RATE: 60 BPM | HEIGHT: 75 IN | BODY MASS INDEX: 31.83 KG/M2

## 2024-04-09 DIAGNOSIS — M54.12 CERVICAL RADICULOPATHY: Primary | ICD-10-CM

## 2024-04-09 DIAGNOSIS — M79.601 PAIN OF RIGHT UPPER EXTREMITY: ICD-10-CM

## 2024-04-09 DIAGNOSIS — M54.12 CERVICAL RADICULOPATHY: ICD-10-CM

## 2024-04-09 DIAGNOSIS — M54.2 CHRONIC NECK PAIN: ICD-10-CM

## 2024-04-09 DIAGNOSIS — G89.29 CHRONIC NECK PAIN: ICD-10-CM

## 2024-04-09 PROCEDURE — 72040 X-RAY EXAM NECK SPINE 2-3 VW: CPT

## 2024-04-09 PROCEDURE — 99213 OFFICE O/P EST LOW 20 MIN: CPT | Performed by: NURSE PRACTITIONER

## 2024-04-09 PROCEDURE — 1036F TOBACCO NON-USER: CPT | Performed by: NURSE PRACTITIONER

## 2024-04-09 PROCEDURE — G8417 CALC BMI ABV UP PARAM F/U: HCPCS | Performed by: NURSE PRACTITIONER

## 2024-04-09 PROCEDURE — G8427 DOCREV CUR MEDS BY ELIG CLIN: HCPCS | Performed by: NURSE PRACTITIONER

## 2024-04-09 PROCEDURE — 3017F COLORECTAL CA SCREEN DOC REV: CPT | Performed by: NURSE PRACTITIONER

## 2024-04-09 RX ORDER — GABAPENTIN 100 MG/1
100 CAPSULE ORAL 2 TIMES DAILY
Qty: 20 CAPSULE | Refills: 0 | Status: SHIPPED | OUTPATIENT
Start: 2024-04-09 | End: 2024-04-19

## 2024-04-09 RX ORDER — METHYLPREDNISOLONE 4 MG/1
TABLET ORAL
Qty: 1 KIT | Refills: 0 | Status: SHIPPED | OUTPATIENT
Start: 2024-04-09

## 2024-04-09 ASSESSMENT — ENCOUNTER SYMPTOMS
NAUSEA: 0
BACK PAIN: 1
COUGH: 0
DIARRHEA: 0
VOMITING: 0
SHORTNESS OF BREATH: 0

## 2024-04-09 NOTE — PROGRESS NOTES
Average packs/day: 0.3 packs/day for 40.0 years (10.0 ttl pk-yrs)     Types: Cigarettes     Start date: 1976     Quit date: 2016     Years since quittin.2    Smokeless tobacco: Never   Substance Use Topics    Alcohol use: Yes     Alcohol/week: 7.0 standard drinks of alcohol     Types: 7 Drinks containing 0.5 oz of alcohol per week     Comment: 4-5 times weekly        Vitals:    24 1519   BP: 128/84   Pulse: 60   SpO2: 95%   Weight: 116.1 kg (256 lb)   Height: 1.905 m (6' 3\")     Estimated body mass index is 32 kg/m² as calculated from the following:    Height as of this encounter: 1.905 m (6' 3\").    Weight as of this encounter: 116.1 kg (256 lb).    Physical Exam  Vitals and nursing note reviewed.   Constitutional:       General: He is not in acute distress.     Appearance: Normal appearance. He is well-developed. He is not ill-appearing, toxic-appearing or diaphoretic.   HENT:      Head: Normocephalic and atraumatic.   Eyes:      Conjunctiva/sclera: Conjunctivae normal.      Pupils: Pupils are equal, round, and reactive to light.   Cardiovascular:      Rate and Rhythm: Normal rate and regular rhythm.      Heart sounds: Normal heart sounds. No murmur heard.     No friction rub. No gallop.   Pulmonary:      Effort: Pulmonary effort is normal. No respiratory distress.      Breath sounds: Normal breath sounds. No stridor. No wheezing, rhonchi or rales.   Musculoskeletal:      Right shoulder: No laceration or tenderness. Normal range of motion.      Cervical back: Bony tenderness present. No swelling, edema, deformity, erythema, signs of trauma, lacerations, rigidity, spasms, torticollis or tenderness. Normal range of motion.   Neurological:      General: No focal deficit present.      Mental Status: He is alert and oriented to person, place, and time. Mental status is at baseline.      Cranial Nerves: No cranial nerve deficit.       ASSESSMENT/PLAN:  1. Cervical radiculopathy  - methylPREDNISolone

## 2024-04-09 NOTE — PATIENT INSTRUCTIONS
Neck x-ray  Medrol dose pack (oral steroid taper)- you should avoid taking NSAIDs while on this medication (ex: ibuprofen, aleve, aspirin). Tylenol is OK to take.   Gabapentin one capsule twice daily- this medication can make you tired  Follow up with your ortho

## 2024-05-16 RX ORDER — ATORVASTATIN CALCIUM 20 MG/1
20 TABLET, FILM COATED ORAL DAILY
Qty: 90 TABLET | Refills: 3 | Status: SHIPPED | OUTPATIENT
Start: 2024-05-16

## 2024-05-16 RX ORDER — PANTOPRAZOLE SODIUM 40 MG/1
40 TABLET, DELAYED RELEASE ORAL DAILY
Qty: 90 TABLET | Refills: 3 | Status: SHIPPED | OUTPATIENT
Start: 2024-05-16

## 2024-07-15 ENCOUNTER — HOSPITAL ENCOUNTER (INPATIENT)
Age: 64
LOS: 2 days | Discharge: HOME | End: 2024-07-17
Attending: STUDENT IN AN ORGANIZED HEALTH CARE EDUCATION/TRAINING PROGRAM | Admitting: INTERNAL MEDICINE
Payer: COMMERCIAL

## 2024-07-15 ENCOUNTER — APPOINTMENT (OUTPATIENT)
Dept: CT IMAGING | Age: 64
End: 2024-07-15
Payer: COMMERCIAL

## 2024-07-15 DIAGNOSIS — K56.7 ILEUS (HCC): ICD-10-CM

## 2024-07-15 DIAGNOSIS — R10.9 INTRACTABLE ABDOMINAL PAIN: Primary | ICD-10-CM

## 2024-07-15 LAB
ALBUMIN SERPL-MCNC: 4.5 G/DL (ref 3.4–5)
ALBUMIN/GLOB SERPL: 1.7 {RATIO} (ref 1.1–2.2)
ALP SERPL-CCNC: 99 U/L (ref 40–129)
ALT SERPL-CCNC: 25 U/L (ref 10–40)
AMORPH SED URNS QL MICRO: ABNORMAL /HPF
ANION GAP SERPL CALCULATED.3IONS-SCNC: 13 MMOL/L (ref 3–16)
AST SERPL-CCNC: 30 U/L (ref 15–37)
BACTERIA URNS QL MICRO: ABNORMAL /HPF
BASOPHILS # BLD: 0 K/UL (ref 0–0.2)
BASOPHILS NFR BLD: 0.2 %
BILIRUB SERPL-MCNC: 2.7 MG/DL (ref 0–1)
BILIRUB UR QL STRIP.AUTO: ABNORMAL
BUN SERPL-MCNC: 11 MG/DL (ref 7–20)
CALCIUM SERPL-MCNC: 9.3 MG/DL (ref 8.3–10.6)
CHLORIDE SERPL-SCNC: 103 MMOL/L (ref 99–110)
CLARITY UR: CLEAR
CO2 SERPL-SCNC: 24 MMOL/L (ref 21–32)
COLOR UR: YELLOW
CREAT SERPL-MCNC: 0.8 MG/DL (ref 0.8–1.3)
DEPRECATED RDW RBC AUTO: 13.9 % (ref 12.4–15.4)
EOSINOPHIL # BLD: 0 K/UL (ref 0–0.6)
EOSINOPHIL NFR BLD: 0.2 %
EPI CELLS #/AREA URNS HPF: ABNORMAL /HPF (ref 0–5)
GFR SERPLBLD CREATININE-BSD FMLA CKD-EPI: >90 ML/MIN/{1.73_M2}
GLUCOSE SERPL-MCNC: 132 MG/DL (ref 70–99)
GLUCOSE UR STRIP.AUTO-MCNC: NEGATIVE MG/DL
HCT VFR BLD AUTO: 49.3 % (ref 40.5–52.5)
HGB BLD-MCNC: 16.3 G/DL (ref 13.5–17.5)
HGB UR QL STRIP.AUTO: NEGATIVE
HYALINE CASTS #/AREA URNS LPF: ABNORMAL /LPF (ref 0–2)
KETONES UR STRIP.AUTO-MCNC: NEGATIVE MG/DL
LACTATE BLDV-SCNC: 1.6 MMOL/L (ref 0.4–2)
LEUKOCYTE ESTERASE UR QL STRIP.AUTO: NEGATIVE
LIPASE SERPL-CCNC: 36 U/L (ref 13–60)
LYMPHOCYTES # BLD: 0.5 K/UL (ref 1–5.1)
LYMPHOCYTES NFR BLD: 9.5 %
MCH RBC QN AUTO: 32.3 PG (ref 26–34)
MCHC RBC AUTO-ENTMCNC: 33 G/DL (ref 31–36)
MCV RBC AUTO: 97.8 FL (ref 80–100)
MONOCYTES # BLD: 0.3 K/UL (ref 0–1.3)
MONOCYTES NFR BLD: 4.9 %
MUCOUS THREADS #/AREA URNS LPF: ABNORMAL /LPF
NEUTROPHILS # BLD: 4.3 K/UL (ref 1.7–7.7)
NEUTROPHILS NFR BLD: 85.2 %
NITRITE UR QL STRIP.AUTO: NEGATIVE
NT-PROBNP SERPL-MCNC: 370 PG/ML (ref 0–124)
PH UR STRIP.AUTO: 6 [PH] (ref 5–8)
PLATELET # BLD AUTO: 142 K/UL (ref 135–450)
PMV BLD AUTO: 9.6 FL (ref 5–10.5)
POTASSIUM SERPL-SCNC: 3.9 MMOL/L (ref 3.5–5.1)
PROT SERPL-MCNC: 7.2 G/DL (ref 6.4–8.2)
PROT UR STRIP.AUTO-MCNC: 100 MG/DL
RBC # BLD AUTO: 5.04 M/UL (ref 4.2–5.9)
RBC #/AREA URNS HPF: ABNORMAL /HPF (ref 0–4)
RENAL EPI CELLS #/AREA UR COMP ASSIST: ABNORMAL /HPF (ref 0–1)
SODIUM SERPL-SCNC: 140 MMOL/L (ref 136–145)
SP GR UR STRIP.AUTO: >=1.03 (ref 1–1.03)
TROPONIN, HIGH SENSITIVITY: 9 NG/L (ref 0–22)
TROPONIN, HIGH SENSITIVITY: 9 NG/L (ref 0–22)
UA COMPLETE W REFLEX CULTURE PNL UR: ABNORMAL
UA DIPSTICK W REFLEX MICRO PNL UR: YES
URN SPEC COLLECT METH UR: ABNORMAL
UROBILINOGEN UR STRIP-ACNC: 0.2 E.U./DL
WBC # BLD AUTO: 5.1 K/UL (ref 4–11)
WBC #/AREA URNS HPF: ABNORMAL /HPF (ref 0–5)

## 2024-07-15 PROCEDURE — 1200000000 HC SEMI PRIVATE

## 2024-07-15 PROCEDURE — 81001 URINALYSIS AUTO W/SCOPE: CPT

## 2024-07-15 PROCEDURE — 84484 ASSAY OF TROPONIN QUANT: CPT

## 2024-07-15 PROCEDURE — 83880 ASSAY OF NATRIURETIC PEPTIDE: CPT

## 2024-07-15 PROCEDURE — 96374 THER/PROPH/DIAG INJ IV PUSH: CPT

## 2024-07-15 PROCEDURE — 93005 ELECTROCARDIOGRAM TRACING: CPT | Performed by: REGISTERED NURSE

## 2024-07-15 PROCEDURE — 71275 CT ANGIOGRAPHY CHEST: CPT

## 2024-07-15 PROCEDURE — 36415 COLL VENOUS BLD VENIPUNCTURE: CPT

## 2024-07-15 PROCEDURE — 83690 ASSAY OF LIPASE: CPT

## 2024-07-15 PROCEDURE — 2580000003 HC RX 258: Performed by: REGISTERED NURSE

## 2024-07-15 PROCEDURE — 96375 TX/PRO/DX INJ NEW DRUG ADDON: CPT

## 2024-07-15 PROCEDURE — 80053 COMPREHEN METABOLIC PANEL: CPT

## 2024-07-15 PROCEDURE — 83605 ASSAY OF LACTIC ACID: CPT

## 2024-07-15 PROCEDURE — 6360000002 HC RX W HCPCS: Performed by: REGISTERED NURSE

## 2024-07-15 PROCEDURE — 6360000004 HC RX CONTRAST MEDICATION: Performed by: REGISTERED NURSE

## 2024-07-15 PROCEDURE — 85025 COMPLETE CBC W/AUTO DIFF WBC: CPT

## 2024-07-15 PROCEDURE — 99285 EMERGENCY DEPT VISIT HI MDM: CPT

## 2024-07-15 RX ORDER — ENOXAPARIN SODIUM 100 MG/ML
30 INJECTION SUBCUTANEOUS 2 TIMES DAILY
Status: DISCONTINUED | OUTPATIENT
Start: 2024-07-15 | End: 2024-07-17 | Stop reason: HOSPADM

## 2024-07-15 RX ORDER — 0.9 % SODIUM CHLORIDE 0.9 %
1000 INTRAVENOUS SOLUTION INTRAVENOUS ONCE
Status: COMPLETED | OUTPATIENT
Start: 2024-07-15 | End: 2024-07-15

## 2024-07-15 RX ORDER — ONDANSETRON 4 MG/1
4 TABLET, ORALLY DISINTEGRATING ORAL EVERY 8 HOURS PRN
Status: DISCONTINUED | OUTPATIENT
Start: 2024-07-15 | End: 2024-07-17 | Stop reason: HOSPADM

## 2024-07-15 RX ORDER — SODIUM CHLORIDE 0.9 % (FLUSH) 0.9 %
5-40 SYRINGE (ML) INJECTION PRN
Status: DISCONTINUED | OUTPATIENT
Start: 2024-07-15 | End: 2024-07-17 | Stop reason: HOSPADM

## 2024-07-15 RX ORDER — POTASSIUM CHLORIDE 7.45 MG/ML
10 INJECTION INTRAVENOUS
Status: COMPLETED | OUTPATIENT
Start: 2024-07-16 | End: 2024-07-16

## 2024-07-15 RX ORDER — ATORVASTATIN CALCIUM 10 MG/1
20 TABLET, FILM COATED ORAL DAILY
Status: DISCONTINUED | OUTPATIENT
Start: 2024-07-16 | End: 2024-07-17 | Stop reason: HOSPADM

## 2024-07-15 RX ORDER — ACETAMINOPHEN 650 MG/1
650 SUPPOSITORY RECTAL EVERY 6 HOURS PRN
Status: DISCONTINUED | OUTPATIENT
Start: 2024-07-15 | End: 2024-07-17 | Stop reason: HOSPADM

## 2024-07-15 RX ORDER — MAGNESIUM SULFATE IN WATER 40 MG/ML
2000 INJECTION, SOLUTION INTRAVENOUS PRN
Status: DISCONTINUED | OUTPATIENT
Start: 2024-07-15 | End: 2024-07-17 | Stop reason: HOSPADM

## 2024-07-15 RX ORDER — ONDANSETRON 2 MG/ML
4 INJECTION INTRAMUSCULAR; INTRAVENOUS ONCE
Status: COMPLETED | OUTPATIENT
Start: 2024-07-15 | End: 2024-07-15

## 2024-07-15 RX ORDER — GABAPENTIN 100 MG/1
100 CAPSULE ORAL 2 TIMES DAILY
Status: DISCONTINUED | OUTPATIENT
Start: 2024-07-15 | End: 2024-07-17 | Stop reason: HOSPADM

## 2024-07-15 RX ORDER — SODIUM CHLORIDE 9 MG/ML
INJECTION, SOLUTION INTRAVENOUS PRN
Status: DISCONTINUED | OUTPATIENT
Start: 2024-07-15 | End: 2024-07-17 | Stop reason: HOSPADM

## 2024-07-15 RX ORDER — MORPHINE SULFATE 4 MG/ML
4 INJECTION, SOLUTION INTRAMUSCULAR; INTRAVENOUS ONCE
Status: COMPLETED | OUTPATIENT
Start: 2024-07-15 | End: 2024-07-15

## 2024-07-15 RX ORDER — POLYETHYLENE GLYCOL 3350 17 G/17G
17 POWDER, FOR SOLUTION ORAL DAILY PRN
Status: DISCONTINUED | OUTPATIENT
Start: 2024-07-15 | End: 2024-07-17 | Stop reason: HOSPADM

## 2024-07-15 RX ORDER — POTASSIUM CHLORIDE 20 MEQ/1
40 TABLET, EXTENDED RELEASE ORAL PRN
Status: DISCONTINUED | OUTPATIENT
Start: 2024-07-15 | End: 2024-07-17 | Stop reason: HOSPADM

## 2024-07-15 RX ORDER — SODIUM CHLORIDE, SODIUM LACTATE, POTASSIUM CHLORIDE, CALCIUM CHLORIDE 600; 310; 30; 20 MG/100ML; MG/100ML; MG/100ML; MG/100ML
INJECTION, SOLUTION INTRAVENOUS CONTINUOUS
Status: DISCONTINUED | OUTPATIENT
Start: 2024-07-15 | End: 2024-07-17 | Stop reason: HOSPADM

## 2024-07-15 RX ORDER — SODIUM CHLORIDE 0.9 % (FLUSH) 0.9 %
5-40 SYRINGE (ML) INJECTION EVERY 12 HOURS SCHEDULED
Status: DISCONTINUED | OUTPATIENT
Start: 2024-07-15 | End: 2024-07-17 | Stop reason: HOSPADM

## 2024-07-15 RX ORDER — ACETAMINOPHEN 325 MG/1
650 TABLET ORAL EVERY 6 HOURS PRN
Status: DISCONTINUED | OUTPATIENT
Start: 2024-07-15 | End: 2024-07-17 | Stop reason: HOSPADM

## 2024-07-15 RX ORDER — ONDANSETRON 2 MG/ML
4 INJECTION INTRAMUSCULAR; INTRAVENOUS EVERY 6 HOURS PRN
Status: DISCONTINUED | OUTPATIENT
Start: 2024-07-15 | End: 2024-07-17 | Stop reason: HOSPADM

## 2024-07-15 RX ORDER — PANTOPRAZOLE SODIUM 40 MG/1
40 TABLET, DELAYED RELEASE ORAL DAILY
Status: DISCONTINUED | OUTPATIENT
Start: 2024-07-16 | End: 2024-07-17 | Stop reason: HOSPADM

## 2024-07-15 RX ORDER — POTASSIUM CHLORIDE 7.45 MG/ML
10 INJECTION INTRAVENOUS PRN
Status: DISCONTINUED | OUTPATIENT
Start: 2024-07-15 | End: 2024-07-17 | Stop reason: HOSPADM

## 2024-07-15 RX ADMIN — ONDANSETRON 4 MG: 2 INJECTION INTRAMUSCULAR; INTRAVENOUS at 17:32

## 2024-07-15 RX ADMIN — IOPAMIDOL 75 ML: 755 INJECTION, SOLUTION INTRAVENOUS at 17:38

## 2024-07-15 RX ADMIN — MORPHINE SULFATE 4 MG: 4 INJECTION, SOLUTION INTRAMUSCULAR; INTRAVENOUS at 17:32

## 2024-07-15 RX ADMIN — HYDROMORPHONE HYDROCHLORIDE 1 MG: 1 INJECTION, SOLUTION INTRAMUSCULAR; INTRAVENOUS; SUBCUTANEOUS at 18:19

## 2024-07-15 RX ADMIN — SODIUM CHLORIDE 1000 ML: 9 INJECTION, SOLUTION INTRAVENOUS at 17:31

## 2024-07-15 ASSESSMENT — PAIN SCALES - GENERAL
PAINLEVEL_OUTOF10: 10
PAINLEVEL_OUTOF10: 5
PAINLEVEL_OUTOF10: 10
PAINLEVEL_OUTOF10: 9

## 2024-07-15 ASSESSMENT — ENCOUNTER SYMPTOMS
NAUSEA: 1
VOMITING: 0
ABDOMINAL PAIN: 1
CONSTIPATION: 0
CHEST TIGHTNESS: 0
SHORTNESS OF BREATH: 0
DIARRHEA: 0

## 2024-07-15 ASSESSMENT — PAIN DESCRIPTION - ORIENTATION
ORIENTATION: MID
ORIENTATION: MID

## 2024-07-15 ASSESSMENT — PAIN DESCRIPTION - LOCATION
LOCATION: ABDOMEN
LOCATION: ABDOMEN

## 2024-07-15 ASSESSMENT — PAIN DESCRIPTION - DESCRIPTORS
DESCRIPTORS: SORE;STABBING
DESCRIPTORS: ACHING

## 2024-07-15 ASSESSMENT — LIFESTYLE VARIABLES
HOW MANY STANDARD DRINKS CONTAINING ALCOHOL DO YOU HAVE ON A TYPICAL DAY: 1 OR 2
HOW OFTEN DO YOU HAVE A DRINK CONTAINING ALCOHOL: 4 OR MORE TIMES A WEEK

## 2024-07-15 ASSESSMENT — PAIN DESCRIPTION - PAIN TYPE: TYPE: ACUTE PAIN

## 2024-07-15 ASSESSMENT — PAIN - FUNCTIONAL ASSESSMENT: PAIN_FUNCTIONAL_ASSESSMENT: 0-10

## 2024-07-15 NOTE — ED NOTES
[facility] returned call back with vascular surgery on the phone who is speaking to Kimberley ARBOLEDA

## 2024-07-15 NOTE — ED PROVIDER NOTES
I independently performed a history and physical on Naga Begum.     I have discussed the case with the LENO/resident at 1730 and approve / take responsibility for the initial management plan and anticipated disposition as documented below.     In summary the patient presents with acute onset severe abdominal pain with nausea and vomiting.  The pain is diffuse with radiation to the back.  The patient has a history of diverticulitis and fears a recurrence.  On my evaluation the patient is afebrile hemodynamically stable although quite uncomfortable appearing clutching his abdomen and writhing about the bed.  He is sweaty and pale.  His breath sounds are clear.  His abdomen is mildly distended and guarded though with careful examination there is notably no rigidity.  No CVA tenderness.  Extremities warm and well-perfused.  Given his presenting condition with concern for an acute surgical abdomen blood workup is initiated further characterize.  His workup was overall reassuring.  We did discuss the concern for vascular lesion of the left femoral vessels with vascular surgery and there was no concern from their perspective and unlikely to be contributing to his presenting symptoms.  Imaging also notable for a possible ileus and certainly this could be consistent with his presenting symptoms.  No other high risk features to his initial workup but given his refractory symptoms he was admitted for further observation    I interpreted the following studies:    ECG: Sinus rhythm at a rate of 63 without ectopy.  Normal axis and intervals.  No evidence of acute ischemia.  Interpretation compromised by artifact no clear ST elevations or depressions.  EKG is not significantly changed from prior dated April 14, 2022.    I personally discussed the patient's management with the following:  na         For further details of Naga Begum's emergency department encounter, please see the LENO/resident's documentation. Please note the  signature time recorded here indicates the limit of my supervision of this case and should the patient require further management prior to disposition I have signed the case out to my colleague Jax Hall MD  07/15/24 2930

## 2024-07-15 NOTE — ED TRIAGE NOTES
Patient states he has had severe abdominal pain that started earlier today, diarrhea, history of diverticuliti

## 2024-07-15 NOTE — ED PROVIDER NOTES
[address]-SURGICAL  EMERGENCY DEPARTMENT ENCOUNTER        Pt Name: Naga Begum  MRN: [id number]  Birthdate 1960  Date of evaluation: 7/15/2024  Provider: SAWYER Whitlock CNP  PCP: Linda Bhagat APRN - CNP    This patient was seen and evaluated by the attending physician Dr. Flores.    I have evaluated this patient. My supervising physician was available for consultation.      CHIEF COMPLAINT       Chief Complaint   Patient presents with    Abdominal Pain     Patient states he has had severe abdominal pain that started earlier today, diarrhea, history of diverticulitis..        HISTORY OF PRESENT ILLNESS   (Location/Symptom, Timing/Onset, Context/Setting, Quality, Duration, Modifying Factors, Severity)  Note limiting factors.     History from : Patient  Naga Begum is a 64 y.o. male who presents via private car for  abdominal pain, nausea. Onset was approximately 1 hour prior . Duration has been since the onset. Context includes patient presents to the emergency department today with sudden onset of abdominal pain.  He states the pain is \"going through to my back\".  He states that it began suddenly approximately an hour prior to arrival.  He does have history of diverticulitis but states this pain does feel different.  He denies chest pain, palpitations.  He denies shortness of breath, cough congestion or recent fevers.  He denies diarrhea or constipation.. Quality is cramping, aching, with radiation to to his back.  Patient states \"the pain goes through to my back\".. Alleviating factors include nothing. Aggravating factors include nothing. Pain is 10/10.  Nothing has been used for pain today.       Chart review reveals pt has significant PMHx of diverticulitis, gerd. They take atorvastatin, gabapentin, protonix.     Nursing Notes were all reviewed and agreed with or any disagreements were addressed  in the HPI.      REVIEW OF SYSTEMS    (2-9 systems for level 4, 10 or more  IMPRESSION      1. Intractable abdominal pain    2. Ileus (HCC)          DISPOSITION/PLAN   DISPOSITION Admitted 07/15/2024 10:38:54 PM      PATIENT REFERREDTO:  No follow-up provider specified.    DISCHARGE MEDICATIONS:  Current Discharge Medication List          DISCONTINUED MEDICATIONS:  Current Discharge Medication List                 (Please note that portions ofthis note were completed with a voice recognition program.  Efforts were made to edit the dictations but occasionally words are mis-transcribed.)    SAWYER Whitlock CNP (electronically signed)       Kimberley Jean APRN - CNP  07/16/24 7892

## 2024-07-15 NOTE — ED NOTES
Pts SpO2 75% after receiving IVP Dilaudid. Placed pt on 2LO2 NC and SpO2 now 95%. Notified GASTON Jean.

## 2024-07-16 PROBLEM — R10.9 INTRACTABLE ABDOMINAL PAIN: Status: ACTIVE | Noted: 2024-07-16

## 2024-07-16 LAB
ANION GAP SERPL CALCULATED.3IONS-SCNC: 9 MMOL/L (ref 3–16)
BUN SERPL-MCNC: 9 MG/DL (ref 7–20)
CALCIUM SERPL-MCNC: 8.6 MG/DL (ref 8.3–10.6)
CHLORIDE SERPL-SCNC: 104 MMOL/L (ref 99–110)
CO2 SERPL-SCNC: 23 MMOL/L (ref 21–32)
CREAT SERPL-MCNC: <0.5 MG/DL (ref 0.8–1.3)
EKG ATRIAL RATE: 63 BPM
EKG DIAGNOSIS: NORMAL
EKG P AXIS: 64 DEGREES
EKG P-R INTERVAL: 170 MS
EKG Q-T INTERVAL: 438 MS
EKG QRS DURATION: 96 MS
EKG QTC CALCULATION (BAZETT): 448 MS
EKG R AXIS: 25 DEGREES
EKG T AXIS: 44 DEGREES
EKG VENTRICULAR RATE: 63 BPM
GFR SERPLBLD CREATININE-BSD FMLA CKD-EPI: >90 ML/MIN/{1.73_M2}
GLUCOSE SERPL-MCNC: 107 MG/DL (ref 70–99)
POTASSIUM SERPL-SCNC: 4.2 MMOL/L (ref 3.5–5.1)
SODIUM SERPL-SCNC: 136 MMOL/L (ref 136–145)

## 2024-07-16 PROCEDURE — 93010 ELECTROCARDIOGRAM REPORT: CPT | Performed by: INTERNAL MEDICINE

## 2024-07-16 PROCEDURE — 80048 BASIC METABOLIC PNL TOTAL CA: CPT

## 2024-07-16 PROCEDURE — 99221 1ST HOSP IP/OBS SF/LOW 40: CPT | Performed by: SURGERY

## 2024-07-16 PROCEDURE — 6360000002 HC RX W HCPCS: Performed by: INTERNAL MEDICINE

## 2024-07-16 PROCEDURE — 1200000000 HC SEMI PRIVATE

## 2024-07-16 PROCEDURE — 36415 COLL VENOUS BLD VENIPUNCTURE: CPT

## 2024-07-16 PROCEDURE — 2580000003 HC RX 258: Performed by: INTERNAL MEDICINE

## 2024-07-16 PROCEDURE — 99232 SBSQ HOSP IP/OBS MODERATE 35: CPT | Performed by: INTERNAL MEDICINE

## 2024-07-16 PROCEDURE — APPSS30 APP SPLIT SHARED TIME 16-30 MINUTES

## 2024-07-16 PROCEDURE — 6370000000 HC RX 637 (ALT 250 FOR IP): Performed by: INTERNAL MEDICINE

## 2024-07-16 RX ADMIN — SODIUM CHLORIDE, PRESERVATIVE FREE 10 ML: 5 INJECTION INTRAVENOUS at 01:30

## 2024-07-16 RX ADMIN — SODIUM CHLORIDE, POTASSIUM CHLORIDE, SODIUM LACTATE AND CALCIUM CHLORIDE: 600; 310; 30; 20 INJECTION, SOLUTION INTRAVENOUS at 01:34

## 2024-07-16 RX ADMIN — ENOXAPARIN SODIUM 30 MG: 100 INJECTION SUBCUTANEOUS at 21:12

## 2024-07-16 RX ADMIN — POTASSIUM CHLORIDE 10 MEQ: 7.46 INJECTION, SOLUTION INTRAVENOUS at 02:13

## 2024-07-16 RX ADMIN — HYDROMORPHONE HYDROCHLORIDE 0.5 MG: 1 INJECTION, SOLUTION INTRAMUSCULAR; INTRAVENOUS; SUBCUTANEOUS at 01:27

## 2024-07-16 RX ADMIN — ACETAMINOPHEN 650 MG: 325 TABLET ORAL at 16:25

## 2024-07-16 RX ADMIN — ENOXAPARIN SODIUM 30 MG: 100 INJECTION SUBCUTANEOUS at 01:28

## 2024-07-16 RX ADMIN — SODIUM CHLORIDE, PRESERVATIVE FREE 10 ML: 5 INJECTION INTRAVENOUS at 08:32

## 2024-07-16 RX ADMIN — ENOXAPARIN SODIUM 30 MG: 100 INJECTION SUBCUTANEOUS at 08:31

## 2024-07-16 RX ADMIN — Medication 10 ML: at 01:28

## 2024-07-16 RX ADMIN — POTASSIUM CHLORIDE 10 MEQ: 7.46 INJECTION, SOLUTION INTRAVENOUS at 03:43

## 2024-07-16 RX ADMIN — SODIUM CHLORIDE, PRESERVATIVE FREE 10 ML: 5 INJECTION INTRAVENOUS at 21:12

## 2024-07-16 ASSESSMENT — PAIN DESCRIPTION - PAIN TYPE: TYPE: ACUTE PAIN

## 2024-07-16 ASSESSMENT — PAIN DESCRIPTION - ORIENTATION: ORIENTATION: MID

## 2024-07-16 ASSESSMENT — PAIN - FUNCTIONAL ASSESSMENT
PAIN_FUNCTIONAL_ASSESSMENT: ACTIVITIES ARE NOT PREVENTED
PAIN_FUNCTIONAL_ASSESSMENT: ACTIVITIES ARE NOT PREVENTED

## 2024-07-16 ASSESSMENT — PAIN SCALES - WONG BAKER: WONGBAKER_NUMERICALRESPONSE: HURTS A LITTLE BIT

## 2024-07-16 ASSESSMENT — PAIN DESCRIPTION - DESCRIPTORS
DESCRIPTORS: ACHING
DESCRIPTORS: ACHING

## 2024-07-16 ASSESSMENT — PAIN SCALES - GENERAL
PAINLEVEL_OUTOF10: 2
PAINLEVEL_OUTOF10: 2
PAINLEVEL_OUTOF10: 3
PAINLEVEL_OUTOF10: 5
PAINLEVEL_OUTOF10: 5

## 2024-07-16 ASSESSMENT — PAIN DESCRIPTION - LOCATION
LOCATION: ABDOMEN
LOCATION: ABDOMEN
LOCATION: HEAD

## 2024-07-16 NOTE — CARE COORDINATION
Case Management Assessment  Initial Evaluation    Date/Time of Evaluation: 7/16/2024 9:53 AM  Assessment Completed by: Soledad Chow    If patient is discharged prior to next notation, then this note serves as note for discharge by case management.    Patient Name: Naga Begum                   YOB: 1960  Diagnosis: Ileus (HCC) [K56.7]  Intractable abdominal pain [R10.9]                   Date / Time: 7/15/2024  4:47 PM    Patient Admission Status: Inpatient   Readmission Risk (Low < 19, Mod (19-27), High > 27): Readmission Risk Score: 3.9    Current PCP: Linda Bhagat, SAWYER - CNP  PCP verified by CM? Yes ([place])    Chart Reviewed: Yes      History Provided by: Patient  Patient Orientation: Alert and Oriented    Patient Cognition: Alert    Hospitalization in the last 30 days (Readmission):  No    If yes, Readmission Assessment in CM Navigator will be completed.    Advance Directives:      Code Status: Full Code   Patient's Primary Decision Maker is: Patient Declined (Legal Next of Kin Remains as Decision Maker)    Primary Decision Maker: ChrisakilTracey - Spouse - [phone]    Discharge Planning:    Patient lives with: Spouse/Significant Other Type of Home: House  Primary Care Giver: Self  Patient Support Systems include: Spouse/Significant Other   Current Financial resources: None  Current community resources: None  Current services prior to admission: None            Current DME:              Type of Home Care services:  None    ADLS  Prior functional level: Independent in ADLs/IADLs  Current functional level: Independent in ADLs/IADLs    PT AM-PAC:   /24  OT AM-PAC:   /24    Family can provide assistance at DC: Yes  Would you like Case Management to discuss the discharge plan with any other family members/significant others, and if so, who? No  Plans to Return to Present Housing: Yes  Other Identified Issues/Barriers to RETURNING to current housing: none  Potential Assistance needed

## 2024-07-16 NOTE — PLAN OF CARE
Problem: Discharge Planning  Goal: Discharge to home or other facility with appropriate resources  7/16/2024 1104 by Lesley Moss RN  Outcome: Progressing  7/16/2024 0221 by Kerry Hess RN  Outcome: Progressing     Problem: Pain  Goal: Verbalizes/displays adequate comfort level or baseline comfort level  7/16/2024 1104 by Lesley Moss RN  Outcome: Progressing  7/16/2024 0221 by Kerry Hess RN  Outcome: Progressing     Problem: Safety - Adult  Goal: Free from fall injury  7/16/2024 1104 by Lesley Moss RN  Outcome: Progressing  7/16/2024 0221 by Kerry Hess RN  Outcome: Progressing

## 2024-07-16 NOTE — PROGRESS NOTES
Progress Note    Admit Date:  7/15/2024    64 y.o. male with pmh of diverticulosis, hyperlipidemia and neuropathy.  With the 2-week history of intermittent episodes of diarrhea for which he has been using Imodium is needed and days history of progressive abdominal pain.    Admitted to med-surg for abdominal pain.  General surgery consulted    Subjective:  Mr. Begum still looks very ill and fatigued   complains of persistent nausea   had some diarrhea.         Objective:   Patient Vitals for the past 4 hrs:   BP Temp Temp src Pulse Resp SpO2   07/16/24 1341 133/82 98.7 °F (37.1 °C) Oral 57 18 96 %          Intake/Output Summary (Last 24 hours) at 7/16/2024 1431  Last data filed at 7/16/2024 1242  Gross per 24 hour   Intake 1000 ml   Output --   Net 1000 ml       Physical Exam:  Gen: No distress. Alert. Looks ill, fatigued   Eyes: PERRL. No sclera icterus. No conjunctival injection.   ENT: No discharge. Pharynx clear.   Neck: No JVD.  No Carotid Bruit. Trachea midline.  Resp: No accessory muscle use. No crackles. No wheezes. No rhonchi.   CV: Regular rate. Regular rhythm. No murmur. No rub. No edema.   Capillary Refill: Brisk,< 3 seconds   Peripheral Pulses: +2 palpable, equal bilaterally   GI: Non-tender. Non-distended. No masses. No organomegaly. Normal bowel sounds. No hernia.   Skin: Warm and dry. No nodule on exposed extremities. No rash on exposed extremities.   M/S: No cyanosis. No joint deformity. No clubbing.   Neuro: Awake. Grossly nonfocal    Psych: Oriented x 3. No anxiety or agitation      Data:  CBC:   Recent Labs     07/15/24  1720   WBC 5.1   HGB 16.3   HCT 49.3   MCV 97.8        BMP:   Recent Labs     07/15/24  1720 07/16/24  0539    136   K 3.9 4.2    104   CO2 24 23   BUN 11 9   CREATININE 0.8 <0.5*     LIVER PROFILE:   Recent Labs     07/15/24  1720   AST 30   ALT 25   LIPASE 36.0   BILITOT 2.7*   ALKPHOS 99     PT/INR: No results for input(s): \"PROTIME\", \"INR\" in the last 72  hours.    CULTURES  Results       Procedure Component Value Units Date/Time    Clostridium Difficile Toxin/Antigen [[id number]]     Order Status: No result Specimen: Stool              RADIOLOGY  CTA CHEST ABDOMEN PELVIS W CONTRAST   Final Result   1. No aortic dissection or other acute finding in the chest.   2. Short segment dissection versus a penetrating ulcer at the posterior   aspect of the left common iliac artery proximally.   3. Thickened appearance of the gastric antrum which could be related to   gastritis or peptic ulcer disease.   4. Distal small bowel loops are mildly distended, contain more fluid than is   typically seen and there are multiple air-fluid levels which is nonspecific,   but could represent ileus or small-bowel enteritis.   5. No acute findings elsewhere in the abdomen or pelvis.   6. Colonic diverticulosis.   7. Small hiatal hernia.   8. Prostatomegaly.   9. Mild atherosclerosis with normal patency of the branches of the aorta.   10. Borderline cardiomegaly.   11. Mild emphysema.               Assessment/Plan:    Abdominal pain  Gastritis or PUD  -admitted to med-surg.  General surgery consulted  -less likely ileus  -GI Consulted.   -advanced to full liquid diet.   -IVF's. PPI daily ordered    Patient has persistent nausea; does not really have emesis as he has had previous Nissen fundoplication    Prostatomegaly  -should consider seeing urology as outpatient.     Iliac artery dissection  -discussed with vascular surgery  -follow up with Dr. Villagomez in 6 weeks.    Hypertension  -BP stable.  Monitor for now.     Hyperlipidemia  -on Atorvastatin    GERD  -on PPI    Neuropathy  -continue Gabapentin     DVT Prophylaxis: Lovenox  Diet: ADULT DIET; Full Liquid  Code Status: Full Code       James Etienne MD

## 2024-07-16 NOTE — CONSULTS
General Surgery   Consult Note      Pt Name: Naga Begum  MRN: [id number]  YOB: 1960  Primary Care Physician: Linda Bhagat APRN - CNP    Patient evaluated at the request of Dr. Miles  Reason for evaluation: Ileus  Date of evaluation: 7/16/2024  Admit date: 7/15/2024  LOS: Day 1    SUBJECTIVE:   History of Chief Complaint:    Naga Begum is a 64 y.o. male who presented with abdominal pain. Onset of severe pain was yesterday. Located mid-low abdomen. Associated with hot flashes and chills, nausea and diarrhea. He notes diarrhea on and off for approximately 2 weeks. States that oral intake seems to quickly precipitate his urge to have a BM. States he visited [place] recently and then [place] and towards the end of that trip he started to develop these sxs. His wife, who accompanied him to [place] also states she had similar sxs and states she is still slightly nauseous at times.  Denies fever, chest pain, sob, issues with urination.  Prior abdominal surgical history includes CCY, nissen fundoplication and appendectomy.        Past Medical History   has a past medical history of Diverticulitis, Erectile dysfunction, GERD (gastroesophageal reflux disease), Neuropathy, and Osteoarthritis.    Past Surgical History   has a past surgical history that includes Cholecystectomy; knee surgery (Right); Gastric fundoplication; Appendectomy; Total knee arthroplasty (Right, 02/28/2023); and Upper gastrointestinal endoscopy.    Medications  Prior to Admission medications    Medication Sig Start Date End Date Taking? Authorizing Provider   atorvastatin (LIPITOR) 20 MG tablet Take 1 tablet by mouth daily 5/16/24   Linda Bhagat APRN - CNP   pantoprazole (PROTONIX) 40 MG tablet Take 1 tablet by mouth daily 5/16/24   Linda Bhagat APRN - CNP   methylPREDNISolone (MEDROL DOSEPACK) 4 MG tablet Take by mouth.  Patient not taking: Reported on 7/16/2024 4/9/24   Linda Bhagat APRN - CNP    gabapentin (NEURONTIN) 100 MG capsule Take 1 capsule by mouth 2 times daily for 10 days. Intended supply: 30 days 4/9/24 4/19/24  Linda Bhagat APRN - CNP    Scheduled Meds:   sodium chloride flush  5-40 mL IntraVENous 2 times per day    enoxaparin  30 mg SubCUTAneous BID    atorvastatin  20 mg Oral Daily    gabapentin  100 mg Oral BID    pantoprazole  40 mg Oral Daily     Continuous Infusions:   sodium chloride      lactated ringers IV soln 75 mL/hr at 07/16/24 0134     PRN Meds:.sodium chloride flush, sodium chloride, potassium chloride **OR** potassium alternative oral replacement **OR** potassium chloride, magnesium sulfate, ondansetron **OR** ondansetron, polyethylene glycol, acetaminophen **OR** acetaminophen, HYDROmorphone    Allergies  is allergic to pcn [penicillins].    Family History  family history includes Arthritis in his father; Atrial Fibrillation in his father; Colon Cancer in his father; Parkinsonism in his father; Prostate Cancer in his father.    Social History   reports that he quit smoking about 8 years ago. His smoking use included cigarettes. He started smoking about 48 years ago. He has a 10.0 pack-year smoking history. He has never used smokeless tobacco. He reports current alcohol use of about 7.0 standard drinks of alcohol per week. He reports current drug use. Frequency: 3.00 times per week. Drug: Marijuana (Weed).    Review of Systems:    Constitutional: Negative for fever +hot flashes +chills  HENT: Negative for sore throat   Eyes: Negative for redness   Respiratory: Negative  for dyspnea, cough   Cardiovascular: Negative for chest pain   Gastrointestinal: Negative for vomiting, +abd pain, +nausea, +diarrhea   Genitourinary: Negative for hematuria   Musculoskeletal: Negative for arthralgias   Skin: Negative for rash   Neurological: Negative for syncope   Hematological: Negative for adenopathy   Psychiatric/Behavorial: Negative for anxiety    OBJECTIVE:     Vitals:

## 2024-07-16 NOTE — H&P
V2.0  History and Physical      Name:  Naga Begum /Age/Sex: 1960  (64 y.o. male)   MRN & CSN:  [id number] & [id number] Encounter Date/Time: 7/15/2024 10:41 PM EDT   Location:   PCP: Linda Bhagat APRN - CNP       Hospital Day: 1    Assessment and Plan:   Naga Begum is a 64 y.o. male with a pmh of hyperlipidemia, neuropathy, diverticulosis who presents with Ileus ([facility])    Hospital Problems             Last Modified POA    * (Principal) Ileus ([facility]) 7/15/2024 Yes   Hyperlipidemia  Neuropathy  Abdominal pain  Iliac artery dissection  Gastroesophageal reflux disease    Plan:  Admit to medicine service  Keep n.p.o.  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 Israel vascular surgery in 6 weeks.  Lovenox for DVT prophylaxis  Resume home dose of atorvastatin Protonix and gabapentin.    Disposition:   Current Living situation: Lives in the community with family  Expected Disposition: Home  Estimated D/C: 2 days    Diet Diet NPO Exceptions are: Ice Chips   DVT Prophylaxis [x] Lovenox, []  Heparin, [] SCDs, [] Ambulation,  [] Eliquis, [] Xarelto, [] Coumadin   Code Status Full Code   Surrogate Decision Maker/ POA      Personally reviewed Lab Studies and Imaging     Discussed management of the case with patient and addressed any concerns that he had.    History from:     patient    History of Present Illness:     Chief Complaint: Abdominal pain  Naga Begum is a 64 y.o. male with pmh of diverticulosis, hyperlipidemia and neuropathy.  With the 2-week history of intermittent episodes of diarrhea for which he has been using Imodium is needed and days history of progressive abdominal pain.  This been no associated fever or chills.  Patient recently traveled to [place] and the intermittent episodes of diarrhea  at the proximal aspect of the left common iliac artery.  The iliac and visualized femoral arteries are normally patent. Other: Bladder is nondistended and otherwise unremarkable.  The prostate gland is enlarged measuring as much as 5.3 cm.  There is no free fluid. Bones/Soft Tissues: No fracture or destructive bone lesion is noted.     1. No aortic dissection or other acute finding in the chest. 2. Short segment dissection versus a penetrating ulcer at the posterior aspect of the left common iliac artery proximally. 3. Thickened appearance of the gastric antrum which could be related to gastritis or peptic ulcer disease. 4. Distal small bowel loops are mildly distended, contain more fluid than is typically seen and there are multiple air-fluid levels which is nonspecific, but could represent ileus or small-bowel enteritis. 5. No acute findings elsewhere in the abdomen or pelvis. 6. Colonic diverticulosis. 7. Small hiatal hernia. 8. Prostatomegaly. 9. Mild atherosclerosis with normal patency of the branches of the aorta. 10. Borderline cardiomegaly. 11. Mild emphysema.         Electronically signed by Brandee Miles MD on 7/15/2024 at 10:41 PM

## 2024-07-16 NOTE — PROGRESS NOTES
Patient admitted to room 233 from ER. Side rails up x2. Bed is locked and in lowest position. Call light placed in patient reach. Patient explained the routine of the hospital, including but not limited to lab work, vital signs, hourly rounding, etc. Care plans and education updated, CHG wipes given at time of admission.     BP (!) 148/82   Pulse 59   Temp 98.4 °F (36.9 °C) (Oral)   Resp 16   Ht 1.905 m (6' 3\")   Wt 111.1 kg (245 lb)   SpO2 95%   BMI 30.62 kg/m²     Most recent set of vitals as shown.        4 Eyes Skin Assessment     The patient is being assess for   Admission    I agree that 2 RN's have performed a thorough Head to Toe Skin Assessment on the patient. ALL assessment sites listed below have been assessed.      Areas assessed for pressure by both nurses:   [x]   Head, Face, and Ears   [x]   Shoulders, Back, and Chest, Abdomen  [x]   Arms, Elbows, and Hands   []   Coccyx, Sacrum, and Ischium  [x]   Legs, Feet, and Heels  No issues seen or reported      **SHARE this note so that the co-signing nurse is able to place an eSignature**    Co-signer eSignature: Electronically signed by Guru Rock RN on 7/16/24 at 6:35 AM EDT  Primary Nurse eSignature: Electronically signed by HERNAN AGUIRRE RN on 7/16/24 at 1:06 AM EDT      Bedside Mobility Assessment Tool (BMAT):     Assessment Level 1- Sit and Shake    1. From a semi-reclined position, ask patient to sit up and rotate to a seated position at the side of the bed. Can use the bedrail.    2. Ask patient to reach out and grab your hand and shake making sure patient reaches across his/her midline.   Pass- Patient is able to come to a seated position, maintain core strength. Maintains seated balance while reaching across midline. Move on to Assessment Level 2.     Assessment Level 2- Stretch and Point   1. With patient in seated position at the side of the bed, have patient place both feet on the floor (or stool) with knees no higher than hips.    2. Ask

## 2024-07-16 NOTE — ACP (ADVANCE CARE PLANNING)
Advance Care Planning     Advance Care Planning Inpatient Note  Spiritual Care Department    Today's Date: 7/16/2024  Unit: [facility] MEDICAL-SURGICAL    Received request from IDT Member.  Upon review of chart and communication with care team, patient's decision making abilities are not in question.. Patient was present in the room during visit.    Goals of ACP Conversation:  Discuss advance care planning documents  Facilitate a discussion related to patient's goals of care as they align with the patient's values and beliefs.    Health Care Decision Makers:       Primary Decision Maker: Tracye Begum - Spouse - [phone]  Summary:  Verified Healthcare Decision Maker    Advance Care Planning Documents (Patient Wishes):  None     Today, Naga confirmed that he wants his spouse to be his primary decision maker for healthcare.  I briefly covered the decision-maker hierarchy for [facility] and he is in agreement that his NOK's would be his decision makers.  I let him know to call [facility] at any point for support or assistance.  Prayer.    Electronically signed by Chaplain Thompson on 7/16/2024 at 10:43 AM          Thank you for consulting [facility]    If you would like a 's presence for emotional, spiritual, grief or comfort care,   please dial \"[phone]\" and ask for the  on-call to be paged.    For help with Advanced Care Planning, Power of  for Healthcare or Living Will forms, you may also call us directly:    [phone]) [place]  [phone]) [place]  [phone]) Outpatient    [facility]

## 2024-07-16 NOTE — FLOWSHEET NOTE
07/16/24 0731   Vital Signs   Temp 98.2 °F (36.8 °C)   Temp Source Oral   Pulse 58   Heart Rate Source Monitor   Respirations 18   BP (!) 143/86   MAP (Calculated) 105   BP Location Left upper arm   BP Method Automatic   Patient Position Supine   Pain Assessment   Pain Assessment None - Denies Pain   Oxygen Therapy   SpO2 97 %   O2 Device None (Room air)     AM assessment completed. No complaints of pain or discomfort voiced.  No signs of symptoms of distress noted. Patient tolerated medications well. Respirations easy and even. Bed in lowest position, bed alarm in place and functioning properly, SR up x 2 and bed in low position. Call light within reach.     Bedside Mobility Assessment Tool (BMAT):     Assessment Level 1- Sit and Shake    1. From a semi-reclined position, ask patient to sit up and rotate to a seated position at the side of the bed. Can use the bedrail.    2. Ask patient to reach out and grab your hand and shake making sure patient reaches across his/her midline.   Pass- Patient is able to come to a seated position, maintain core strength. Maintains seated balance while reaching across midline. Move on to Assessment Level 2.     Assessment Level 2- Stretch and Point   1. With patient in seated position at the side of the bed, have patient place both feet on the floor (or stool) with knees no higher than hips.    2. Ask patient to stretch one leg and straighten the knee, then bend the ankle/flex and point the toes. If appropriate, repeat with the other leg.   Pass- Patient is able to demonstrate appropriate quad strength on intended weight bearing limb(s). Move onto Assessment Level 3.     Assessment Level 3- Stand   1. Ask patient to elevate off the bed or chair (seated to standing) using an assistive device (cane, bedrail).    2. Patient should be able to raise buttocks off be and hold for a count of five. May repeat once.   Pass- Patient maintains standing stability for at least 5 seconds,

## 2024-07-16 NOTE — ACP (ADVANCE CARE PLANNING)
Advance Care Planning     General Advance Care Planning (ACP) Conversation    Date of Conversation: 7/16/2024  Conducted with: Patient with Decision Making Capacity  Other persons present: None    Healthcare Decision Maker:   Primary Decision Maker: Tracey Begum - Spouse - [phone]  Click here to complete Healthcare Decision Makers including selection of the Healthcare Decision Maker Relationship (ie \"Primary\").   Today we documented Decision Maker(s) consistent with Legal Next of Kin hierarchy.    Content/Action Overview:  DECLINED ACP Conversation - will revisit periodically  Reviewed DNR/DNI and patient elects Full Code (Attempt Resuscitation)        Length of Voluntary ACP Conversation in minutes:  <16 minutes (Non-Billable)    Soledad Chow

## 2024-07-17 VITALS
HEIGHT: 75 IN | BODY MASS INDEX: 30.46 KG/M2 | WEIGHT: 245 LBS | SYSTOLIC BLOOD PRESSURE: 151 MMHG | DIASTOLIC BLOOD PRESSURE: 86 MMHG | RESPIRATION RATE: 16 BRPM | OXYGEN SATURATION: 94 % | HEART RATE: 51 BPM | TEMPERATURE: 97.9 F

## 2024-07-17 PROBLEM — K29.70 GASTRITIS WITHOUT BLEEDING: Status: ACTIVE | Noted: 2024-07-17

## 2024-07-17 PROBLEM — I10 PRIMARY HYPERTENSION: Status: ACTIVE | Noted: 2024-07-17

## 2024-07-17 PROCEDURE — 99238 HOSP IP/OBS DSCHRG MGMT 30/<: CPT | Performed by: INTERNAL MEDICINE

## 2024-07-17 PROCEDURE — 2580000003 HC RX 258: Performed by: INTERNAL MEDICINE

## 2024-07-17 PROCEDURE — 6370000000 HC RX 637 (ALT 250 FOR IP): Performed by: INTERNAL MEDICINE

## 2024-07-17 PROCEDURE — 6360000002 HC RX W HCPCS: Performed by: INTERNAL MEDICINE

## 2024-07-17 RX ORDER — PANTOPRAZOLE SODIUM 40 MG/1
40 TABLET, DELAYED RELEASE ORAL DAILY
Qty: 90 TABLET | Refills: 0 | Status: SHIPPED | OUTPATIENT
Start: 2024-07-17

## 2024-07-17 RX ADMIN — SODIUM CHLORIDE, PRESERVATIVE FREE 10 ML: 5 INJECTION INTRAVENOUS at 09:01

## 2024-07-17 RX ADMIN — PANTOPRAZOLE SODIUM 40 MG: 40 TABLET, DELAYED RELEASE ORAL at 08:59

## 2024-07-17 RX ADMIN — ENOXAPARIN SODIUM 30 MG: 100 INJECTION SUBCUTANEOUS at 08:59

## 2024-07-17 RX ADMIN — ATORVASTATIN CALCIUM 20 MG: 10 TABLET, FILM COATED ORAL at 08:59

## 2024-07-17 RX ADMIN — ACETAMINOPHEN 650 MG: 325 TABLET ORAL at 01:18

## 2024-07-17 ASSESSMENT — PAIN SCALES - GENERAL
PAINLEVEL_OUTOF10: 2
PAINLEVEL_OUTOF10: 3
PAINLEVEL_OUTOF10: 0

## 2024-07-17 ASSESSMENT — PAIN DESCRIPTION - DESCRIPTORS: DESCRIPTORS: ACHING;DISCOMFORT

## 2024-07-17 ASSESSMENT — PAIN DESCRIPTION - LOCATION: LOCATION: HEAD

## 2024-07-17 NOTE — PROGRESS NOTES
Progress Note    Admit Date:  7/15/2024    64 y.o. male with pmh of diverticulosis, hyperlipidemia and neuropathy.  With the 2-week history of intermittent episodes of diarrhea for which he has been using Imodium is needed and days history of progressive abdominal pain.    Admitted to med-surg for abdominal pain.  General surgery consulted    Subjective:  Mr. Begum still looks very ill and fatigued   complains of persistent nausea   had some diarrhea.         Objective:   Patient Vitals for the past 4 hrs:   BP Temp Temp src Pulse Resp SpO2   07/17/24 0751 (!) 151/86 97.9 °F (36.6 °C) Oral 51 16 94 %            Intake/Output Summary (Last 24 hours) at 7/17/2024 1043  Last data filed at 7/16/2024 2112  Gross per 24 hour   Intake 255 ml   Output --   Net 255 ml         Physical Exam:  Gen: No distress. Alert. Looks ill, fatigued   Eyes: PERRL. No sclera icterus. No conjunctival injection.   ENT: No discharge. Pharynx clear.   Neck: No JVD.  No Carotid Bruit. Trachea midline.  Resp: No accessory muscle use. No crackles. No wheezes. No rhonchi.   CV: Regular rate. Regular rhythm. No murmur. No rub. No edema.   Capillary Refill: Brisk,< 3 seconds   Peripheral Pulses: +2 palpable, equal bilaterally   GI: Non-tender. Non-distended. No masses. No organomegaly. Normal bowel sounds. No hernia.   Skin: Warm and dry. No nodule on exposed extremities. No rash on exposed extremities.   M/S: No cyanosis. No joint deformity. No clubbing.   Neuro: Awake. Grossly nonfocal    Psych: Oriented x 3. No anxiety or agitation      Data:  CBC:   Recent Labs     07/15/24  1720   WBC 5.1   HGB 16.3   HCT 49.3   MCV 97.8          BMP:   Recent Labs     07/15/24  1720 07/16/24  0539    136   K 3.9 4.2    104   CO2 24 23   BUN 11 9   CREATININE 0.8 <0.5*       LIVER PROFILE:   Recent Labs     07/15/24  1720   AST 30   ALT 25   LIPASE 36.0   BILITOT 2.7*   ALKPHOS 99       PT/INR: No results for input(s): \"PROTIME\", \"INR\" in the

## 2024-07-17 NOTE — PROGRESS NOTES
Patient discharging to home, discharge instructions given all questions answered. Refused wheelchair assistance to family car.

## 2024-07-17 NOTE — DISCHARGE SUMMARY
-patient advised to follow-up with his PCP  - can get an outpatient GI referral if needed.      Prostatomegaly  -should consider seeing urology as outpatient.      Iliac artery dissection  -discussed with vascular surgery  -follow up with Dr. Villagomez in 6 weeks.     Hypertension  -BP stable.  Monitor for now.      Hyperlipidemia  -on Atorvastatin     GERD  -on PPI     Neuropathy  -continue Gabapentin      DVT Prophylaxis: Lovenox    Procedures (Please Review Full Report for Details)  N/A    Consults    General surgery       Physical Exam at Discharge:    BP (!) 151/86   Pulse 51   Temp 97.9 °F (36.6 °C) (Oral)   Resp 16   Ht 1.905 m (6' 3\")   Wt 111.1 kg (245 lb)   SpO2 94%   BMI 30.62 kg/m²   Gen: No distress. Alert. Oriented   Eyes: PERRL. No sclera icterus. No conjunctival injection.   ENT: No discharge. Pharynx clear.   Neck: No JVD.  No Carotid Bruit. Trachea midline.  Resp: No accessory muscle use. No crackles. No wheezes. No rhonchi.   CV: Regular rate. Regular rhythm. No murmur. No rub. No edema.   Capillary Refill: Brisk,< 3 seconds   Peripheral Pulses: +2 palpable, equal bilaterally   GI: Non-tender. Non-distended. No masses. No organomegaly. Normal bowel sounds. No hernia.   Skin: Warm and dry. No nodule on exposed extremities. No rash on exposed extremities.   M/S: No cyanosis. No joint deformity. No clubbing.   Neuro: Awake. Grossly nonfocal    Psych: Oriented x 3. No anxiety or agitation    CBC:   Recent Labs     07/15/24  1720   WBC 5.1   HGB 16.3   HCT 49.3   MCV 97.8        BMP:   Recent Labs     07/15/24  1720 07/16/24  0539    136   K 3.9 4.2    104   CO2 24 23   BUN 11 9   CREATININE 0.8 <0.5*     LIVER PROFILE:   Recent Labs     07/15/24  1720   AST 30   ALT 25   LIPASE 36.0   BILITOT 2.7*   ALKPHOS 99     UA:  Recent Labs     07/15/24  1721   COLORU Yellow   PHUR 6.0   WBCUA 0-2   RBCUA 0-2   MUCUS 3+*   BACTERIA 3+*   CLARITYU Clear   LEUKOCYTESUR Negative          Discharged in stable condition to home.    Follow Up:  Follow up with PCP.

## 2024-07-17 NOTE — FLOWSHEET NOTE
07/16/24 2110   Vital Signs   Temp 97.4 °F (36.3 °C)   Temp Source Oral   Pulse 59   Heart Rate Source Monitor   Respirations 16   BP (!) 147/80   MAP (Calculated) 102   BP Location Left upper arm   BP Method Automatic   Patient Position Semi fowlers   Opioid-Induced Sedation   POSS Score 1   RASS   Godinez Agitation Sedation Scale (RASS) 0   Oxygen Therapy   SpO2 96 %   Pulse Oximetry Type Intermittent   Pulse Oximeter Device Mode Intermittent   Pulse Oximeter Device Location Left;Finger   O2 Device None (Room air)     Patient A+Ox4, vitals and assessments done at this time. Bed in lowest position, call light within reach.

## 2024-08-16 ENCOUNTER — OFFICE VISIT (OUTPATIENT)
Dept: VASCULAR SURGERY | Age: 64
End: 2024-08-16
Payer: COMMERCIAL

## 2024-08-16 VITALS
DIASTOLIC BLOOD PRESSURE: 78 MMHG | BODY MASS INDEX: 30.71 KG/M2 | SYSTOLIC BLOOD PRESSURE: 120 MMHG | HEIGHT: 75 IN | WEIGHT: 247 LBS

## 2024-08-16 DIAGNOSIS — I77.72 ILIAC ARTERY DISSECTION (HCC): Primary | ICD-10-CM

## 2024-08-16 PROCEDURE — 99204 OFFICE O/P NEW MOD 45 MIN: CPT | Performed by: SURGERY

## 2024-08-16 PROCEDURE — 1036F TOBACCO NON-USER: CPT | Performed by: SURGERY

## 2024-08-16 PROCEDURE — G8417 CALC BMI ABV UP PARAM F/U: HCPCS | Performed by: SURGERY

## 2024-08-16 PROCEDURE — G8427 DOCREV CUR MEDS BY ELIG CLIN: HCPCS | Performed by: SURGERY

## 2024-08-16 PROCEDURE — 3074F SYST BP LT 130 MM HG: CPT | Performed by: SURGERY

## 2024-08-16 PROCEDURE — 3017F COLORECTAL CA SCREEN DOC REV: CPT | Performed by: SURGERY

## 2024-08-16 PROCEDURE — 1111F DSCHRG MED/CURRENT MED MERGE: CPT | Performed by: SURGERY

## 2024-08-16 PROCEDURE — 3078F DIAST BP <80 MM HG: CPT | Performed by: SURGERY

## 2024-08-16 NOTE — PROGRESS NOTES
Outpatient Consultation / H&P    Date of Consultation:  8/16/2024    PCP:  Linda Bhagat APRN - CNP     Referring Provider:  Kimberley Jean     Chief Complaint:   Chief Complaint   Patient presents with    Other     Patient coming in for iliac artery dissection. [place]        History of Present Illness:   We are asked to see this patient in consultation by Kimberley Jean regarding an incidental finding of a left Iliac artery dissection seen on CT.    Naga Begum is a 64 y.o. male who presented to ED with c/o abdominal pain and bloating.  He thought this was very similar to prior episodes of diverticulitis.    He reports no left leg pain or claudication.         Past Medical History:  Past Medical History:   Diagnosis Date    Diverticulitis     Erectile dysfunction     GERD (gastroesophageal reflux disease)     Neuropathy     Osteoarthritis        Past Surgical History:  Past Surgical History:   Procedure Laterality Date    APPENDECTOMY      CHOLECYSTECTOMY      GASTRIC FUNDOPLICATION      KNEE SURGERY Right     multiple    TOTAL KNEE ARTHROPLASTY Right 02/28/2023    UPPER GASTROINTESTINAL ENDOSCOPY         Home Medications:   Prior to Admission medications    Medication Sig Start Date End Date Taking? Authorizing Provider   pantoprazole (PROTONIX) 40 MG tablet Take 1 tablet by mouth daily 7/17/24  Yes James Etienne MD   atorvastatin (LIPITOR) 20 MG tablet Take 1 tablet by mouth daily 5/16/24  Yes Linda Bhagat APRN - CNP        Allergies:  Pcn [penicillins]      Social History:      Social History     Socioeconomic History    Marital status:      Spouse name: Not on file    Number of children: Not on file    Years of education: Not on file    Highest education level: Not on file   Occupational History    Not on file   Tobacco Use    Smoking status: Former     Current packs/day: 0.00     Average packs/day: 0.3 packs/day for 40.0 years (10.0 ttl pk-yrs)     Types: Cigarettes     Start

## 2025-05-08 DIAGNOSIS — Z12.5 PROSTATE CANCER SCREENING: ICD-10-CM

## 2025-05-08 DIAGNOSIS — Z00.00 ANNUAL PHYSICAL EXAM: ICD-10-CM

## 2025-05-08 DIAGNOSIS — E78.00 HYPERCHOLESTEREMIA: Primary | ICD-10-CM

## 2025-05-08 RX ORDER — ATORVASTATIN CALCIUM 20 MG/1
20 TABLET, FILM COATED ORAL DAILY
Qty: 90 TABLET | Refills: 0 | Status: SHIPPED | OUTPATIENT
Start: 2025-05-08

## 2025-05-08 NOTE — TELEPHONE ENCOUNTER
Recognizing your time is valuable, attached is a link enabling you to schedule an appointment directly from this message. However, if you prefer, you can always call our office during normal business hours, and we will be happy to schedule your appointment. Thank you and we look forward to seeing you at your appointment.    How to schedule your appointment from this message:  Click the calendar icon at the bottom of this message.  Available appointment days and times will be shown on the next screen. Please choose the appointment time and follow the prompts to complete scheduling.

## 2025-05-08 NOTE — TELEPHONE ENCOUNTER
Due for appointment, call pt to schedule physical and ask him to have fasting labs done 3-4 days prior to that appointment

## 2025-05-09 DIAGNOSIS — Z12.5 PROSTATE CANCER SCREENING: ICD-10-CM

## 2025-05-09 DIAGNOSIS — Z00.00 ANNUAL PHYSICAL EXAM: ICD-10-CM

## 2025-05-09 DIAGNOSIS — E78.00 HYPERCHOLESTEREMIA: ICD-10-CM

## 2025-05-09 LAB
ALBUMIN SERPL-MCNC: 4.2 G/DL (ref 3.4–5)
ALBUMIN/GLOB SERPL: 1.8 {RATIO} (ref 1.1–2.2)
ALP SERPL-CCNC: 75 U/L (ref 40–129)
ALT SERPL-CCNC: 22 U/L (ref 10–40)
ANION GAP SERPL CALCULATED.3IONS-SCNC: 10 MMOL/L (ref 3–16)
AST SERPL-CCNC: 18 U/L (ref 15–37)
BASOPHILS # BLD: 0 K/UL (ref 0–0.2)
BASOPHILS NFR BLD: 0.7 %
BILIRUB SERPL-MCNC: 1.1 MG/DL (ref 0–1)
BUN SERPL-MCNC: 13 MG/DL (ref 7–20)
CALCIUM SERPL-MCNC: 9.8 MG/DL (ref 8.3–10.6)
CHLORIDE SERPL-SCNC: 103 MMOL/L (ref 99–110)
CHOLEST SERPL-MCNC: 166 MG/DL (ref 0–199)
CO2 SERPL-SCNC: 28 MMOL/L (ref 21–32)
CREAT SERPL-MCNC: 0.7 MG/DL (ref 0.8–1.3)
DEPRECATED RDW RBC AUTO: 13.1 % (ref 12.4–15.4)
EOSINOPHIL # BLD: 0 K/UL (ref 0–0.6)
EOSINOPHIL NFR BLD: 1 %
GFR SERPLBLD CREATININE-BSD FMLA CKD-EPI: >90 ML/MIN/{1.73_M2}
GLUCOSE SERPL-MCNC: 112 MG/DL (ref 70–99)
HCT VFR BLD AUTO: 44.7 % (ref 40.5–52.5)
HDLC SERPL-MCNC: 46 MG/DL (ref 40–60)
HGB BLD-MCNC: 15.2 G/DL (ref 13.5–17.5)
LDLC SERPL CALC-MCNC: 98 MG/DL
LYMPHOCYTES # BLD: 1.1 K/UL (ref 1–5.1)
LYMPHOCYTES NFR BLD: 23.8 %
MCH RBC QN AUTO: 32.4 PG (ref 26–34)
MCHC RBC AUTO-ENTMCNC: 33.9 G/DL (ref 31–36)
MCV RBC AUTO: 95.7 FL (ref 80–100)
MONOCYTES # BLD: 0.3 K/UL (ref 0–1.3)
MONOCYTES NFR BLD: 6.7 %
NEUTROPHILS # BLD: 3.2 K/UL (ref 1.7–7.7)
NEUTROPHILS NFR BLD: 67.8 %
PLATELET # BLD AUTO: 177 K/UL (ref 135–450)
PMV BLD AUTO: 9.6 FL (ref 5–10.5)
POTASSIUM SERPL-SCNC: 4.1 MMOL/L (ref 3.5–5.1)
PROT SERPL-MCNC: 6.6 G/DL (ref 6.4–8.2)
PSA SERPL DL<=0.01 NG/ML-MCNC: 1.84 NG/ML (ref 0–4)
RBC # BLD AUTO: 4.67 M/UL (ref 4.2–5.9)
SODIUM SERPL-SCNC: 141 MMOL/L (ref 136–145)
TRIGL SERPL-MCNC: 109 MG/DL (ref 0–150)
VLDLC SERPL CALC-MCNC: 22 MG/DL
WBC # BLD AUTO: 4.8 K/UL (ref 4–11)

## 2025-05-10 LAB
EST. AVERAGE GLUCOSE BLD GHB EST-MCNC: 99.7 MG/DL
HBA1C MFR BLD: 5.1 %

## 2025-05-12 ENCOUNTER — RESULTS FOLLOW-UP (OUTPATIENT)
Dept: FAMILY MEDICINE CLINIC | Age: 65
End: 2025-05-12

## 2025-05-12 ASSESSMENT — PATIENT HEALTH QUESTIONNAIRE - PHQ9
SUM OF ALL RESPONSES TO PHQ QUESTIONS 1-9: 2
1. LITTLE INTEREST OR PLEASURE IN DOING THINGS: SEVERAL DAYS
SUM OF ALL RESPONSES TO PHQ QUESTIONS 1-9: 2
SUM OF ALL RESPONSES TO PHQ QUESTIONS 1-9: 2
2. FEELING DOWN, DEPRESSED OR HOPELESS: SEVERAL DAYS
1. LITTLE INTEREST OR PLEASURE IN DOING THINGS: SEVERAL DAYS
SUM OF ALL RESPONSES TO PHQ9 QUESTIONS 1 & 2: 2
SUM OF ALL RESPONSES TO PHQ QUESTIONS 1-9: 2
2. FEELING DOWN, DEPRESSED OR HOPELESS: SEVERAL DAYS

## 2025-05-19 ENCOUNTER — OFFICE VISIT (OUTPATIENT)
Dept: FAMILY MEDICINE CLINIC | Age: 65
End: 2025-05-19
Payer: MEDICARE

## 2025-05-19 VITALS
HEART RATE: 55 BPM | WEIGHT: 251 LBS | SYSTOLIC BLOOD PRESSURE: 125 MMHG | BODY MASS INDEX: 31.21 KG/M2 | DIASTOLIC BLOOD PRESSURE: 84 MMHG | RESPIRATION RATE: 16 BRPM | TEMPERATURE: 97.5 F | OXYGEN SATURATION: 98 % | HEIGHT: 75 IN

## 2025-05-19 DIAGNOSIS — M54.50 CHRONIC BILATERAL LOW BACK PAIN, UNSPECIFIED WHETHER SCIATICA PRESENT: ICD-10-CM

## 2025-05-19 DIAGNOSIS — E78.00 HYPERCHOLESTEREMIA: ICD-10-CM

## 2025-05-19 DIAGNOSIS — Z00.00 WELCOME TO MEDICARE PREVENTIVE VISIT: Primary | ICD-10-CM

## 2025-05-19 DIAGNOSIS — Z82.0 FAMILY HISTORY OF HUNTINGTON'S DISEASE: ICD-10-CM

## 2025-05-19 DIAGNOSIS — M54.12 CERVICAL RADICULOPATHY: ICD-10-CM

## 2025-05-19 DIAGNOSIS — M54.2 CHRONIC NECK PAIN: ICD-10-CM

## 2025-05-19 DIAGNOSIS — G62.9 PERIPHERAL POLYNEUROPATHY: ICD-10-CM

## 2025-05-19 DIAGNOSIS — Z12.11 COLON CANCER SCREENING: ICD-10-CM

## 2025-05-19 DIAGNOSIS — K21.9 GASTROESOPHAGEAL REFLUX DISEASE, UNSPECIFIED WHETHER ESOPHAGITIS PRESENT: ICD-10-CM

## 2025-05-19 DIAGNOSIS — G89.29 CHRONIC BILATERAL LOW BACK PAIN, UNSPECIFIED WHETHER SCIATICA PRESENT: ICD-10-CM

## 2025-05-19 DIAGNOSIS — G89.29 CHRONIC NECK PAIN: ICD-10-CM

## 2025-05-19 PROCEDURE — G0402 INITIAL PREVENTIVE EXAM: HCPCS | Performed by: NURSE PRACTITIONER

## 2025-05-19 PROCEDURE — 3079F DIAST BP 80-89 MM HG: CPT | Performed by: NURSE PRACTITIONER

## 2025-05-19 PROCEDURE — 3074F SYST BP LT 130 MM HG: CPT | Performed by: NURSE PRACTITIONER

## 2025-05-19 PROCEDURE — 1124F ACP DISCUSS-NO DSCNMKR DOCD: CPT | Performed by: NURSE PRACTITIONER

## 2025-05-19 RX ORDER — GABAPENTIN 300 MG/1
300 CAPSULE ORAL 2 TIMES DAILY
Qty: 60 CAPSULE | Refills: 0 | Status: SHIPPED | OUTPATIENT
Start: 2025-05-19 | End: 2025-06-18

## 2025-05-19 RX ORDER — PANTOPRAZOLE SODIUM 40 MG/1
40 TABLET, DELAYED RELEASE ORAL DAILY
Qty: 90 TABLET | Refills: 3 | Status: SHIPPED | OUTPATIENT
Start: 2025-05-19

## 2025-05-19 SDOH — ECONOMIC STABILITY: FOOD INSECURITY: WITHIN THE PAST 12 MONTHS, YOU WORRIED THAT YOUR FOOD WOULD RUN OUT BEFORE YOU GOT MONEY TO BUY MORE.: NEVER TRUE

## 2025-05-19 SDOH — ECONOMIC STABILITY: FOOD INSECURITY: WITHIN THE PAST 12 MONTHS, THE FOOD YOU BOUGHT JUST DIDN'T LAST AND YOU DIDN'T HAVE MONEY TO GET MORE.: NEVER TRUE

## 2025-05-19 ASSESSMENT — VISUAL ACUITY
OD_CC: 20/40
OS_CC: 20/25

## 2025-05-19 NOTE — PATIENT INSTRUCTIONS
2 drinks a day for men and 1 drink a day for women. Too much alcohol can cause health problems.     Manage other health problems such as diabetes, high blood pressure, and high cholesterol. If you think you may have a problem with alcohol or drug use, talk to your doctor.   Medicines    Take your medicines exactly as prescribed. Call your doctor if you think you are having a problem with your medicine.     If your doctor recommends aspirin, take the amount directed each day. Make sure you take aspirin and not another kind of pain reliever, such as acetaminophen (Tylenol).   When should you call for help?   Call 911 if you have symptoms of a heart attack. These may include:    Chest pain or pressure, or a strange feeling in the chest.     Sweating.     Shortness of breath.     Pain, pressure, or a strange feeling in the back, neck, jaw, or upper belly or in one or both shoulders or arms.     Lightheadedness or sudden weakness.     A fast or irregular heartbeat.   After you call 911, the  may tell you to chew 1 adult-strength or 2 to 4 low-dose aspirin. Wait for an ambulance. Do not try to drive yourself.  Watch closely for changes in your health, and be sure to contact your doctor if you have any problems.  Where can you learn more?  Go to https://www.[employer].net/patientEd and enter F075 to learn more about \"A Healthy Heart: Care Instructions.\"  Current as of: July 31, 2024  Content Version: 14.4  © 3049-8547 [employer].   Care instructions adapted under license by [employer]. If you have questions about a medical condition or this instruction, always ask your healthcare professional. [employer], disclaims any warranty or liability for your use of this information.    Personalized Preventive Plan for Naga Begum - 5/19/2025  Medicare offers a range of preventive health benefits. Some of the tests and screenings are paid in full while other may be subject to a deductible,

## 2025-05-19 NOTE — TELEPHONE ENCOUNTER
LOV 4/9/2024    Future Appointments   Date Time Provider Department Center   5/19/2025  3:00 PM Linda Bhagat APRN - CNP [facility]       
TM=6 cm

## 2025-05-19 NOTE — PROGRESS NOTES
Medicare Annual Wellness Visit    Naga Begum is here for Medicare AWV (WELCOME TO MEDICARE . Father diagnosed with hauntington disease )    Assessment & Plan      Welcome to Medicare preventive visit  Colon cancer screening  -     [facility]  Cervical radiculopathy  -     gabapentin (NEURONTIN) 300 MG capsule; Take 1 capsule by mouth 2 times daily for 30 days., Disp-60 capsule, R-0Normal  Chronic neck pain  -     gabapentin (NEURONTIN) 300 MG capsule; Take 1 capsule by mouth 2 times daily for 30 days., Disp-60 capsule, R-0Normal  Chronic bilateral low back pain, unspecified whether sciatica present  -     gabapentin (NEURONTIN) 300 MG capsule; Take 1 capsule by mouth 2 times daily for 30 days., Disp-60 capsule, R-0Normal  Peripheral polyneuropathy  -     gabapentin (NEURONTIN) 300 MG capsule; Take 1 capsule by mouth 2 times daily for 30 days., Disp-60 capsule, R-0Normal  Family history of [place]'s disease      - cholesterol is controlled, continue Lipitor  - will start gabapentin 300 mg BID for neck, back pain and neuropathy  - discussed risks, benefits, side effects  - discussed referral for genetic testing due to family history of loki's disease in his father, at this time he is undecided if he would like to pursue testing. He will reach out is he decides he would like to pursue this further.   - hyperlipidemia- continue Lipitor     No follow-ups on file.     Subjective     History of Present Illness      On going issues with neuropathy  Chronic neck and back pain  Was following with ortho and they wanted to do lumbar surgery a few years ago but pt declined that  He has intermittent numbness,tingling, shooting pain in the arms, hands, feet  He used to be on gabapentin which helped some with his pain from what he can remember  Went to neuropathy clinic but did not purse treatment  His dad has loki's disease- pt has never had genetic testing, not sure

## 2025-06-10 ENCOUNTER — ANESTHESIA EVENT (OUTPATIENT)
Dept: ENDOSCOPY | Age: 65
End: 2025-06-10
Payer: MEDICARE

## 2025-06-11 NOTE — PROGRESS NOTES
1.  Do not eat or drink anything after 12 midnight prior to surgery.  This includes no water, chewing gum,mints or chewing tobacco, except for bowel prep per MD.  2.  Take pills with a small sip of water on the morning of surgery.  3.  Aspirin, Ibuprofen, Advil, Naproxen, Vitamin E and other Anti-inflammatory products should be stopped as directed by your physician.  4.  Check with your doctor regarding stopping Plavix, Coumadin, Lovenox, or other blood thinners.  5.  Do not smoke and do not drink alcoholic beverages 24 hours prior to surgery. This includes NA Beer.  6.  You may brush your teeth and gargle the morning of surgery.  DO NOT SWALLOW WATER.  7.  You MUST make arrangements for a responsible adult to take you home after your procedure.  You will not be allowed to leave alone or drive yourself home.  It is strongly suggested someone   stay with you the first 24 hours.  Your procedure will be cancelled if you do not have a ride home.  8.  A parent/legal guardian must accompany a child scheduled for procedure and plan to stay at the hospital until the child is discharged.  Please do not bring other children with you.  9.  Please wear simple, loose fitting clothing to the hospital.  Do not bring valuables ( money, credit cards, checkbooks, etc.) 10.  Do not wear any jewelry or piercing on the day of surgery.  All body piercing jewelry must be removed.  11.  If you have dentures, they will be removed before going to the OR; we will provide you a container.  If you wear contact lenses or glasses, they will be removed; please bring a case for them.  12.  Notify your Physician if you develop any illness between now and surgery time; cough, cold, fever, sore throat, nausea, vomiting, etc.   13.  Please notify your physician if you experience dizziness, shortness of breath or blurred vision between now and the time of your surgery.        To provide excellent care, visitors will be limited to two in a room at any  2 seconds or less

## 2025-06-27 ENCOUNTER — HOSPITAL ENCOUNTER (OUTPATIENT)
Age: 65
Setting detail: OUTPATIENT SURGERY
Discharge: HOME | End: 2025-06-27
Attending: INTERNAL MEDICINE | Admitting: INTERNAL MEDICINE
Payer: MEDICARE

## 2025-06-27 ENCOUNTER — ANESTHESIA (OUTPATIENT)
Dept: ENDOSCOPY | Age: 65
End: 2025-06-27
Payer: MEDICARE

## 2025-06-27 VITALS
SYSTOLIC BLOOD PRESSURE: 100 MMHG | HEIGHT: 75 IN | HEART RATE: 58 BPM | OXYGEN SATURATION: 96 % | RESPIRATION RATE: 15 BRPM | WEIGHT: 250 LBS | TEMPERATURE: 98.3 F | BODY MASS INDEX: 31.08 KG/M2 | DIASTOLIC BLOOD PRESSURE: 77 MMHG

## 2025-06-27 DIAGNOSIS — Z12.11 SPECIAL SCREENING FOR MALIGNANT NEOPLASMS, COLON: ICD-10-CM

## 2025-06-27 PROCEDURE — 2500000003 HC RX 250 WO HCPCS: Performed by: ANESTHESIOLOGY

## 2025-06-27 PROCEDURE — 3700000000 HC ANESTHESIA ATTENDED CARE: Performed by: INTERNAL MEDICINE

## 2025-06-27 PROCEDURE — 7100000010 HC PHASE II RECOVERY - FIRST 15 MIN: Performed by: INTERNAL MEDICINE

## 2025-06-27 PROCEDURE — 3700000001 HC ADD 15 MINUTES (ANESTHESIA): Performed by: INTERNAL MEDICINE

## 2025-06-27 PROCEDURE — 88305 TISSUE EXAM BY PATHOLOGIST: CPT

## 2025-06-27 PROCEDURE — 6360000002 HC RX W HCPCS: Performed by: ANESTHESIOLOGY

## 2025-06-27 PROCEDURE — 2709999900 HC NON-CHARGEABLE SUPPLY: Performed by: INTERNAL MEDICINE

## 2025-06-27 PROCEDURE — 3609010600 HC COLONOSCOPY POLYPECTOMY SNARE/COLD BIOPSY: Performed by: INTERNAL MEDICINE

## 2025-06-27 PROCEDURE — 6360000002 HC RX W HCPCS: Performed by: NURSE ANESTHETIST, CERTIFIED REGISTERED

## 2025-06-27 PROCEDURE — 7100000011 HC PHASE II RECOVERY - ADDTL 15 MIN: Performed by: INTERNAL MEDICINE

## 2025-06-27 RX ORDER — LIDOCAINE HYDROCHLORIDE 20 MG/ML
INJECTION, SOLUTION INFILTRATION; PERINEURAL
Status: DISCONTINUED | OUTPATIENT
Start: 2025-06-27 | End: 2025-06-27 | Stop reason: SDUPTHER

## 2025-06-27 RX ORDER — OXYCODONE HYDROCHLORIDE 5 MG/1
5 TABLET ORAL PRN
Status: DISCONTINUED | OUTPATIENT
Start: 2025-06-27 | End: 2025-06-27 | Stop reason: HOSPADM

## 2025-06-27 RX ORDER — SODIUM CHLORIDE 0.9 % (FLUSH) 0.9 %
5-40 SYRINGE (ML) INJECTION PRN
Status: DISCONTINUED | OUTPATIENT
Start: 2025-06-27 | End: 2025-06-27 | Stop reason: HOSPADM

## 2025-06-27 RX ORDER — OXYCODONE HYDROCHLORIDE 5 MG/1
10 TABLET ORAL PRN
Status: DISCONTINUED | OUTPATIENT
Start: 2025-06-27 | End: 2025-06-27 | Stop reason: HOSPADM

## 2025-06-27 RX ORDER — ONDANSETRON 2 MG/ML
4 INJECTION INTRAMUSCULAR; INTRAVENOUS
Status: DISCONTINUED | OUTPATIENT
Start: 2025-06-27 | End: 2025-06-27 | Stop reason: HOSPADM

## 2025-06-27 RX ORDER — SODIUM CHLORIDE 9 MG/ML
INJECTION, SOLUTION INTRAVENOUS PRN
Status: DISCONTINUED | OUTPATIENT
Start: 2025-06-27 | End: 2025-06-27 | Stop reason: HOSPADM

## 2025-06-27 RX ORDER — SODIUM CHLORIDE 0.9 % (FLUSH) 0.9 %
5-40 SYRINGE (ML) INJECTION EVERY 12 HOURS SCHEDULED
Status: DISCONTINUED | OUTPATIENT
Start: 2025-06-27 | End: 2025-06-27 | Stop reason: HOSPADM

## 2025-06-27 RX ORDER — MEPERIDINE HYDROCHLORIDE 25 MG/ML
12.5 INJECTION INTRAMUSCULAR; INTRAVENOUS; SUBCUTANEOUS EVERY 5 MIN PRN
Status: DISCONTINUED | OUTPATIENT
Start: 2025-06-27 | End: 2025-06-27 | Stop reason: RX

## 2025-06-27 RX ORDER — PROPOFOL 10 MG/ML
INJECTION, EMULSION INTRAVENOUS
Status: DISCONTINUED | OUTPATIENT
Start: 2025-06-27 | End: 2025-06-27 | Stop reason: SDUPTHER

## 2025-06-27 RX ORDER — DIPHENHYDRAMINE HYDROCHLORIDE 50 MG/ML
12.5 INJECTION, SOLUTION INTRAMUSCULAR; INTRAVENOUS
Status: DISCONTINUED | OUTPATIENT
Start: 2025-06-27 | End: 2025-06-27 | Stop reason: HOSPADM

## 2025-06-27 RX ORDER — LABETALOL HYDROCHLORIDE 5 MG/ML
10 INJECTION, SOLUTION INTRAVENOUS
Status: DISCONTINUED | OUTPATIENT
Start: 2025-06-27 | End: 2025-06-27 | Stop reason: HOSPADM

## 2025-06-27 RX ORDER — SODIUM CHLORIDE, SODIUM LACTATE, POTASSIUM CHLORIDE, CALCIUM CHLORIDE 600; 310; 30; 20 MG/100ML; MG/100ML; MG/100ML; MG/100ML
INJECTION, SOLUTION INTRAVENOUS CONTINUOUS
Status: DISCONTINUED | OUTPATIENT
Start: 2025-06-27 | End: 2025-06-27 | Stop reason: HOSPADM

## 2025-06-27 RX ORDER — NALOXONE HYDROCHLORIDE 0.4 MG/ML
INJECTION, SOLUTION INTRAMUSCULAR; INTRAVENOUS; SUBCUTANEOUS PRN
Status: DISCONTINUED | OUTPATIENT
Start: 2025-06-27 | End: 2025-06-27 | Stop reason: HOSPADM

## 2025-06-27 RX ADMIN — PROPOFOL 50 MG: 10 INJECTION, EMULSION INTRAVENOUS at 08:31

## 2025-06-27 RX ADMIN — PROPOFOL 80 MG: 10 INJECTION, EMULSION INTRAVENOUS at 08:25

## 2025-06-27 RX ADMIN — PROPOFOL 20 MG: 10 INJECTION, EMULSION INTRAVENOUS at 08:39

## 2025-06-27 RX ADMIN — LIDOCAINE HYDROCHLORIDE 60 MG: 20 INJECTION, SOLUTION INFILTRATION; PERINEURAL at 08:25

## 2025-06-27 RX ADMIN — Medication 20 MG: at 08:16

## 2025-06-27 RX ADMIN — PROPOFOL 50 MG: 10 INJECTION, EMULSION INTRAVENOUS at 08:35

## 2025-06-27 RX ADMIN — PROPOFOL 50 MG: 10 INJECTION, EMULSION INTRAVENOUS at 08:28

## 2025-06-27 ASSESSMENT — PAIN - FUNCTIONAL ASSESSMENT
PAIN_FUNCTIONAL_ASSESSMENT: 0-10
PAIN_FUNCTIONAL_ASSESSMENT: 0-10

## 2025-06-27 ASSESSMENT — PAIN DESCRIPTION - DESCRIPTORS: DESCRIPTORS: OTHER (COMMENT)

## 2025-06-27 NOTE — ANESTHESIA POSTPROCEDURE EVALUATION
Department of Anesthesiology  Postprocedure Note    Patient: Naga Begum  MRN: [id number]  YOB: 1960  Date of evaluation: 6/27/2025    Procedure Summary       Date: 06/27/25 Room / Location: [address]    Anesthesia Start: 0819 Anesthesia Stop: 0850    Procedure: COLONOSCOPY POLYPECTOMY SNARE/BIOPSY Diagnosis:       Special screening for malignant neoplasms, colon      (Special screening for malignant neoplasms, colon [Z12.11])    Surgeons: Jarrett Saravia MD Responsible Provider: Sascha Lutz MD    Anesthesia Type: MAC ASA Status: 2            Anesthesia Type: No value filed.    Bonnie Phase I: Bonnie Score: 10    Bonnie Phase II: Bonnie Score: 10    Anesthesia Post Evaluation    Patient location during evaluation: PACU  Patient participation: complete - patient participated  Level of consciousness: awake and alert  Airway patency: patent  Nausea & Vomiting: no nausea and no vomiting  Cardiovascular status: blood pressure returned to baseline  Respiratory status: acceptable  Hydration status: euvolemic  Comments: VSS on transfer to phase 2 recovery.  No anesthetic complications.  Pain management: adequate    No notable events documented.

## 2025-06-27 NOTE — H&P
[facility]   Pre-operative History and Physical    Patient: Naga Begum  : 1960  Acct#:     HISTORY OF PRESENT ILLNESS:    The patient is a 65 y.o. male who presents for crc screening     Indications: crc screening     No prior colon     Past Medical History:        Diagnosis Date    Diverticulitis     Erectile dysfunction     GERD (gastroesophageal reflux disease)     Neuropathy     Osteoarthritis       Past Surgical History:        Procedure Laterality Date    APPENDECTOMY      CHOLECYSTECTOMY      GASTRIC FUNDOPLICATION      KNEE SURGERY Right     multiple    TOTAL KNEE ARTHROPLASTY Right 2023    UPPER GASTROINTESTINAL ENDOSCOPY        Medications Prior to Admission:   No current facility-administered medications on file prior to encounter.     Current Outpatient Medications on File Prior to Encounter   Medication Sig Dispense Refill    pantoprazole (PROTONIX) 40 MG tablet TAKE 1 TABLET DAILY 90 tablet 3    gabapentin (NEURONTIN) 300 MG capsule Take 1 capsule by mouth 2 times daily for 30 days. 60 capsule 0    atorvastatin (LIPITOR) 20 MG tablet TAKE 1 TABLET DAILY 90 tablet 0        Allergies:  Pcn [penicillins]    Social History:   Social History     Socioeconomic History    Marital status:      Spouse name: Not on file    Number of children: Not on file    Years of education: Not on file    Highest education level: Not on file   Occupational History    Not on file   Tobacco Use    Smoking status: Former     Current packs/day: 0.00     Average packs/day: 0.3 packs/day for 46.7 years (12.0 ttl pk-yrs)     Types: Cigarettes     Start date: 1976     Quit date: 2016     Years since quittin.4    Smokeless tobacco: Never   Vaping Use    Vaping status: Never Used   Substance and Sexual Activity    Alcohol use: Yes     Alcohol/week: 7.0 standard drinks of alcohol     Types: 7 Drinks containing 0.5 oz of alcohol per week     Comment: 4-5 times weekly    Drug use: Yes      Frequency: 3.0 times per week     Types: Marijuana (Weed)    Sexual activity: Not Currently     Partners: Female   Other Topics Concern    Not on file   Social History Narrative    Not on file     Social Drivers of Health     Financial Resource Strain: Low Risk  (2/21/2024)    Overall Financial Resource Strain (CARDIA)     Difficulty of Paying Living Expenses: Not hard at all   Food Insecurity: No Food Insecurity (5/19/2025)    Hunger Vital Sign     Worried About Running Out of Food in the Last Year: Never true     Ran Out of Food in the Last Year: Never true   Transportation Needs: No Transportation Needs (5/19/2025)    PRAPARE - Transportation     Lack of Transportation (Medical): No     Lack of Transportation (Non-Medical): No   Physical Activity: Sufficiently Active (5/19/2025)    Exercise Vital Sign     Days of Exercise per Week: 7 days     Minutes of Exercise per Session: 30 min   Stress: Not on file   Social Connections: Not on file   Intimate Partner Violence: Not on file   Housing Stability: Low Risk  (5/19/2025)    Housing Stability Vital Sign     Unable to Pay for Housing in the Last Year: No     Number of Times Moved in the Last Year: 0     Homeless in the Last Year: No      Family History:       Problem Relation Age of Onset    Colon Cancer Father     Parkinsonism Father     Atrial Fibrillation Father     Arthritis Father     Prostate Cancer Father         PHYSICAL EXAM:      /83   Pulse 61   Temp 98.4 °F (36.9 °C) (Temporal)   Resp 16   Ht 1.905 m (6' 3\")   Wt 113.4 kg (250 lb)   SpO2 97%   BMI 31.25 kg/m²  I        Heart:  Normal apical impulse, regular rate and rhythm, normal S1 and S2, no S3 or S4, and no murmur noted    Lungs:  No increased work of breathing, good air exchange, clear to auscultation bilaterally, no crackles or wheezing    Abdomen:  No scars, normal bowel sounds, soft, non-distended, non-tender, no masses palpated, no hepatosplenomegally      ASA Class  ASA 3 - Patient

## 2025-06-27 NOTE — ANESTHESIA PRE PROCEDURE
Department of Anesthesiology  Preprocedure Note       Name:  Naga Begum   Age:  65 y.o.  :  1960                                          MRN:  [id number]         Date:  2025      Surgeon: Surgeon(s):  Jarrett Saravia MD    Procedure: Procedure(s):  COLON W/ANES.    Medications prior to admission:   Prior to Admission medications    Medication Sig Start Date End Date Taking? Authorizing Provider   pantoprazole (PROTONIX) 40 MG tablet TAKE 1 TABLET DAILY 25  Yes Linda Bhagat APRN - CNP   gabapentin (NEURONTIN) 300 MG capsule Take 1 capsule by mouth 2 times daily for 30 days. 25 Yes Linda Bhagat APRN - CNP   atorvastatin (LIPITOR) 20 MG tablet TAKE 1 TABLET DAILY 25  Yes Linda Bhagat APRN - CNP       Current medications:    Current Facility-Administered Medications   Medication Dose Route Frequency Provider Last Rate Last Admin   • famotidine (PEPCID) 20 MG/2ML 20 mg in sodium chloride (PF) 0.9 % 10 mL injection  20 mg IntraVENous Once Zana Clark MD       • lactated ringers infusion   IntraVENous Continuous Zana Clark MD       • sodium chloride flush 0.9 % injection 5-40 mL  5-40 mL IntraVENous 2 times per day Zana Clark MD       • sodium chloride flush 0.9 % injection 5-40 mL  5-40 mL IntraVENous PRN Zana Clark MD       • 0.9 % sodium chloride infusion   IntraVENous PRN Zana Clark MD           Allergies:    Allergies   Allergen Reactions   • Pcn [Penicillins] Itching and Rash     As a child       Problem List:    Patient Active Problem List   Diagnosis Code   • Chronic pain of right knee M25.561, G89.29   • Other chest pain R07.89   • Chest pain R07.9   • Gastroesophageal reflux disease without esophagitis K21.9   • Diverticulosis K57.90   • Ileus (HCC) K56.7   • Intractable abdominal pain R10.9   • Gastritis without bleeding K29.70   • Primary hypertension I10       Past

## 2025-06-27 NOTE — DISCHARGE INSTRUCTIONS
PATIENT INSTRUCTIONS  POST-SEDATION    Pathology will show up on the [facility] portal with Dr. Saravia's attestation attached. This is different than your MyChart.      IMMEDIATELY FOLLOWING PROCEDURE:    Do not drive or operate machinery for the first twenty four hours after surgery.     Do not make any important decisions for twenty four hours after surgery or while taking narcotic pain medications or sedatives.     You should NOT BE LEFT UNATTENDED OR ALONE. A responsible adult should be with you for the rest of the day of your procedure and also during the night for your protection and safety.    You may experience some light headedness. Rest at home with activity as tolerated. You may not need to go to bed, but it is important to rest for the next 24 hours. You should not engage in athletic sports such as basketball, volleyball, jogging, skating, or activities requiring refined motor skills for 24 hours.   If you develop intractable nausea and vomiting or a severe headache please notify your doctor immediately.   You are not expected to have any fever, but if you feel warm, take your temperature. If you have a fever 101 degrees or higher, call your doctor.       If you have had a colonoscopy:   *Do not expect a normal bowel movement for approximately three days due to the cleansing of the large intestine prior to colonoscopy.    ONCE YOU ARE HOME, IF YOU SHOULD HAVE:  Difficulty in breathing, persistent nausea or vomiting, bleeding you feel is excessive, or pain that is unusual, increased abdominal bloating, or any swelling, fever / chills, call your physician. If you cannot contact your physician, but feel that your signs and symptoms need a physician's attention, go to the Emergency Department.      FOLLOW-UP:    Please follow up with your primary care provider as scheduled or needed.    Please call [phone] with any GI concerns.    Repeat Colonoscopy pending polyps pathology.  have not urinated within 8 hours after the procedure.  \"           Resume your medications unless otherwise instructed.     Contact your physician if you have any questions or concerns.     IF YOU REPORT TO AN EMERGENCY ROOM, DOCTOR'S OFFICE OR HOSPITAL WITHIN 24 HOURS AFTER YOUR PROCEDURE, BRING THIS SHEET AND YOUR AFTER VISIT SUMMARY WITH YOU AND GIVE IT TO THE PHYSICIAN OR NURSE ATTENDING YOU.    ANESTHESIA DISCHARGE INSTRUCTIONS    You are under the influence of drugs- do not drink alcohol, drive a car, operate machinery(such as power tools, kitchen appliances, etc), sign legal documents, or make any important decisions for 24 hours (or while on pain medications).   Children should not ride bikes or skate boards or play on gym sets  for 24 hours after surgery.  A responsible adult should be with you for 24 hours.  Rest at home today- increase activity as tolerated.  Progress slowly to a regular diet unless your physician has instructed you otherwise. Drink plenty of water.    CALL YOUR DOCTOR IF YOU:  Have moderate to severe nausea or vomiting AND are unable to hold down fluids or prescribed medications.  Have bright red bloody drainage from your dressing that won't stop oozing.  Do not get relief with your pain medication    NORMAL (POSSIBLE) SIDE EFFECTS FROM ANESTHESIA:     Confusion, temporary memory loss, delayed reaction times in the first 24 hours  Lightheadedness, dizziness, difficulty focusing, blurred vision  Nausea/vomiting can happen  Shivering, feeling cold, sore throat, cough and muscle aches should stop within 24-48 hours  Trouble urinating - call your surgeon if it has been more than 8 hrs  Bruising or soreness at the IV site - call if it remains red, firm or there is drainage             FEMALES OF CHILDBEARING AGE WHO ARE TAKING BIRTH CONTROL PILLS:  You may have received a medication during your procedure that interferes with the   actions of birth control pills (Bridion or Emend). Use some

## 2025-08-06 RX ORDER — ATORVASTATIN CALCIUM 20 MG/1
20 TABLET, FILM COATED ORAL DAILY
Qty: 90 TABLET | Refills: 3 | Status: SHIPPED | OUTPATIENT
Start: 2025-08-06

## 2025-08-19 ENCOUNTER — OFFICE VISIT (OUTPATIENT)
Dept: FAMILY MEDICINE CLINIC | Age: 65
End: 2025-08-19
Payer: MEDICARE

## 2025-08-19 VITALS
SYSTOLIC BLOOD PRESSURE: 126 MMHG | HEART RATE: 56 BPM | DIASTOLIC BLOOD PRESSURE: 81 MMHG | BODY MASS INDEX: 31.25 KG/M2 | RESPIRATION RATE: 16 BRPM | WEIGHT: 250 LBS | TEMPERATURE: 97.1 F | OXYGEN SATURATION: 97 %

## 2025-08-19 DIAGNOSIS — H61.23 BILATERAL IMPACTED CERUMEN: ICD-10-CM

## 2025-08-19 DIAGNOSIS — G57.93 NEUROPATHY INVOLVING BOTH LOWER EXTREMITIES: Primary | ICD-10-CM

## 2025-08-19 DIAGNOSIS — G57.93 NEUROPATHY INVOLVING BOTH LOWER EXTREMITIES: ICD-10-CM

## 2025-08-19 LAB
FOLATE SERPL-MCNC: 4.78 NG/ML (ref 4.78–24.2)
TSH SERPL DL<=0.005 MIU/L-ACNC: 1.76 UIU/ML (ref 0.27–4.2)
VIT B12 SERPL-MCNC: 225 PG/ML (ref 211–911)

## 2025-08-19 PROCEDURE — 99214 OFFICE O/P EST MOD 30 MIN: CPT | Performed by: NURSE PRACTITIONER

## 2025-08-19 PROCEDURE — 1124F ACP DISCUSS-NO DSCNMKR DOCD: CPT | Performed by: NURSE PRACTITIONER

## 2025-08-19 PROCEDURE — 3074F SYST BP LT 130 MM HG: CPT | Performed by: NURSE PRACTITIONER

## 2025-08-19 PROCEDURE — 3079F DIAST BP 80-89 MM HG: CPT | Performed by: NURSE PRACTITIONER

## 2025-08-19 SDOH — ECONOMIC STABILITY: FOOD INSECURITY: WITHIN THE PAST 12 MONTHS, YOU WORRIED THAT YOUR FOOD WOULD RUN OUT BEFORE YOU GOT MONEY TO BUY MORE.: NEVER TRUE

## 2025-08-19 SDOH — ECONOMIC STABILITY: FOOD INSECURITY: WITHIN THE PAST 12 MONTHS, THE FOOD YOU BOUGHT JUST DIDN'T LAST AND YOU DIDN'T HAVE MONEY TO GET MORE.: NEVER TRUE

## 2025-08-19 ASSESSMENT — ENCOUNTER SYMPTOMS
BACK PAIN: 1
VOMITING: 0
COUGH: 0
NAUSEA: 0
DIARRHEA: 0
SHORTNESS OF BREATH: 0

## 2025-08-19 ASSESSMENT — PATIENT HEALTH QUESTIONNAIRE - PHQ9
SUM OF ALL RESPONSES TO PHQ QUESTIONS 1-9: 0
2. FEELING DOWN, DEPRESSED OR HOPELESS: NOT AT ALL
SUM OF ALL RESPONSES TO PHQ QUESTIONS 1-9: 0
1. LITTLE INTEREST OR PLEASURE IN DOING THINGS: NOT AT ALL
DEPRESSION UNABLE TO ASSESS: FUNCTIONAL CAPACITY MOTIVATION LIMITS ACCURACY

## 2025-08-25 ENCOUNTER — TELEPHONE (OUTPATIENT)
Dept: FAMILY MEDICINE CLINIC | Age: 65
End: 2025-08-25

## (undated) DEVICE — ENDO CARRY-ON PROCEDURE KIT INCLUDES SUCTION TUBING, LUBRICANT, GAUZE, BIOHAZARD STICKER, TRANSPORT PAD AND INTERCEPT BEDSIDE KIT.: Brand: ENDO CARRY-ON PROCEDURE KIT

## (undated) DEVICE — SNARE VASC L240CM LOOP W10MM SHTH DIA2.4MM RND STIFF CLD

## (undated) DEVICE — TRAP POLYP ETRAP

## (undated) DEVICE — ELECTRODE,RADIOTRANSLUCENT,FOAM,3PK: Brand: MEDLINE